# Patient Record
Sex: FEMALE | Race: OTHER | HISPANIC OR LATINO
[De-identification: names, ages, dates, MRNs, and addresses within clinical notes are randomized per-mention and may not be internally consistent; named-entity substitution may affect disease eponyms.]

---

## 2021-02-25 ENCOUNTER — APPOINTMENT (OUTPATIENT)
Dept: INTERNAL MEDICINE | Facility: CLINIC | Age: 71
End: 2021-02-25
Payer: MEDICARE

## 2021-02-25 VITALS
OXYGEN SATURATION: 96 % | RESPIRATION RATE: 14 BRPM | WEIGHT: 136 LBS | TEMPERATURE: 97.8 F | SYSTOLIC BLOOD PRESSURE: 122 MMHG | HEIGHT: 61 IN | DIASTOLIC BLOOD PRESSURE: 65 MMHG | BODY MASS INDEX: 25.68 KG/M2 | HEART RATE: 84 BPM

## 2021-02-25 DIAGNOSIS — Z82.49 FAMILY HISTORY OF ISCHEMIC HEART DISEASE AND OTHER DISEASES OF THE CIRCULATORY SYSTEM: ICD-10-CM

## 2021-02-25 DIAGNOSIS — Z00.00 ENCOUNTER FOR GENERAL ADULT MEDICAL EXAMINATION W/OUT ABNORMAL FINDINGS: ICD-10-CM

## 2021-02-25 DIAGNOSIS — Z87.42 PERSONAL HISTORY OF OTHER DISEASES OF THE FEMALE GENITAL TRACT: ICD-10-CM

## 2021-02-25 PROCEDURE — 99203 OFFICE O/P NEW LOW 30 MIN: CPT | Mod: 25

## 2021-02-25 PROCEDURE — G0439: CPT

## 2021-02-25 PROCEDURE — 99072 ADDL SUPL MATRL&STAF TM PHE: CPT

## 2021-02-25 PROCEDURE — G0444 DEPRESSION SCREEN ANNUAL: CPT | Mod: 59

## 2021-02-25 RX ORDER — ASPIRIN 81 MG
600-200 TABLET, DELAYED RELEASE (ENTERIC COATED) ORAL
Refills: 0 | Status: ACTIVE | COMMUNITY

## 2021-02-25 NOTE — PHYSICAL EXAM
[No Acute Distress] : no acute distress [Well Nourished] : well nourished [Well Developed] : well developed [Well-Appearing] : well-appearing [Normal Sclera/Conjunctiva] : normal sclera/conjunctiva [PERRL] : pupils equal round and reactive to light [EOMI] : extraocular movements intact [Normal Outer Ear/Nose] : the outer ears and nose were normal in appearance [Normal Oropharynx] : the oropharynx was normal [Normal TMs] : both tympanic membranes were normal [Normal Nasal Mucosa] : the nasal mucosa was normal [No JVD] : no jugular venous distention [No Lymphadenopathy] : no lymphadenopathy [Supple] : supple [Thyroid Normal, No Nodules] : the thyroid was normal and there were no nodules present [No Respiratory Distress] : no respiratory distress  [No Accessory Muscle Use] : no accessory muscle use [Clear to Auscultation] : lungs were clear to auscultation bilaterally [Normal Rate] : normal rate  [Regular Rhythm] : with a regular rhythm [Normal S1, S2] : normal S1 and S2 [No Murmur] : no murmur heard [No Carotid Bruits] : no carotid bruits [No Abdominal Bruit] : a ~M bruit was not heard ~T in the abdomen [No Varicosities] : no varicosities [Pedal Pulses Present] : the pedal pulses are present [No Edema] : there was no peripheral edema [No Palpable Aorta] : no palpable aorta [No Extremity Clubbing/Cyanosis] : no extremity clubbing/cyanosis [Soft] : abdomen soft [Non Tender] : non-tender [Non-distended] : non-distended [No Masses] : no abdominal mass palpated [No HSM] : no HSM [Normal Bowel Sounds] : normal bowel sounds [Normal Posterior Cervical Nodes] : no posterior cervical lymphadenopathy [Normal Anterior Cervical Nodes] : no anterior cervical lymphadenopathy [No CVA Tenderness] : no CVA  tenderness [No Spinal Tenderness] : no spinal tenderness [No Joint Swelling] : no joint swelling [No Rash] : no rash [Coordination Grossly Intact] : coordination grossly intact [No Focal Deficits] : no focal deficits [Normal Gait] : normal gait [Deep Tendon Reflexes (DTR)] : deep tendon reflexes were 2+ and symmetric [Speech Grossly Normal] : speech grossly normal [Memory Grossly Normal] : memory grossly normal [Normal Affect] : the affect was normal [Alert and Oriented x3] : oriented to person, place, and time [Normal Mood] : the mood was normal [Normal Insight/Judgement] : insight and judgment were intact [de-identified] : +decreased strength RUE, mild rigidity UE B/L

## 2021-02-25 NOTE — HISTORY OF PRESENT ILLNESS
[de-identified] : 71 y/o female patient is new to the office presents today with  for AWV/establish care \par - f/u Parkinson's disease  - patient currently following with neurology last appointment approx 2-3 months ago, states recently increased dosage of Cabidopa/levodopa, states feels helps control tremors better but if takes on empty stomach causes headaches/N/V

## 2021-02-25 NOTE — REVIEW OF SYSTEMS
[Fever] : no fever [Chills] : no chills [Fatigue] : fatigue [Hot Flashes] : no hot flashes [Night Sweats] : no night sweats [Recent Change In Weight] : ~T no recent weight change [Joint Pain] : no joint pain [Joint Stiffness] : joint stiffness [Joint Swelling] : no joint swelling [Muscle Weakness] : no muscle weakness [Muscle Pain] : no muscle pain [Back Pain] : no back pain [Headache] : no headache [Dizziness] : no dizziness [Fainting] : no fainting [Confusion] : no confusion [Memory Loss] : memory loss [Unsteady Walking] : no ataxia [Suicidal] : not suicidal [Insomnia] : no insomnia [Anxiety] : anxiety [Depression] : no depression [Negative] : Heme/Lymph

## 2021-02-25 NOTE — HEALTH RISK ASSESSMENT
[] : No [No] : In the past 12 months have you used drugs other than those required for medical reasons? No [No falls in past year] : Patient reported no falls in the past year [0] : 2) Feeling down, depressed, or hopeless: Not at all (0) [[id number]] : 0 [HIV test declined] : HIV test declined [Hepatitis C test declined] : Hepatitis C test declined [Change in mental status noted] : No change in mental status noted [None] : None [With Family] : lives with family [Retired] : retired [] :  [# Of Children ___] : has [unfilled] children [Sexually Active] : sexually active [High Risk Behavior] : no high risk behavior [Feels Safe at Home] : Feels safe at home [Fully functional (bathing, dressing, toileting, transferring, walking, feeding)] : Fully functional (bathing, dressing, toileting, transferring, walking, feeding) [Fully functional (using the telephone, shopping, preparing meals, housekeeping, doing laundry, using] : Fully functional and needs no help or supervision to perform IADLs (using the telephone, shopping, preparing meals, housekeeping, doing laundry, using transportation, managing medications and managing finances) [Reports changes in hearing] : Reports no changes in hearing [Reports changes in vision] : Reports no changes in vision [Reports changes in dental health] : Reports no changes in dental health

## 2021-03-03 LAB
25(OH)D3 SERPL-MCNC: 29 NG/ML
ALBUMIN SERPL ELPH-MCNC: 4.2 G/DL
ALP BLD-CCNC: 98 U/L
ALT SERPL-CCNC: 10 U/L
ANA PAT FLD IF-IMP: ABNORMAL
ANA SER IF-ACNC: ABNORMAL
ANION GAP SERPL CALC-SCNC: 14 MMOL/L
APPEARANCE: ABNORMAL
AST SERPL-CCNC: 18 U/L
BACTERIA: ABNORMAL
BASOPHILS # BLD AUTO: 0.11 K/UL
BASOPHILS NFR BLD AUTO: 1.2 %
BILIRUB SERPL-MCNC: 0.2 MG/DL
BILIRUBIN URINE: NEGATIVE
BLOOD URINE: ABNORMAL
BUN SERPL-MCNC: 19 MG/DL
CALCIUM SERPL-MCNC: 9.5 MG/DL
CHLORIDE SERPL-SCNC: 100 MMOL/L
CHOLEST SERPL-MCNC: 236 MG/DL
CO2 SERPL-SCNC: 25 MMOL/L
COLOR: YELLOW
CREAT SERPL-MCNC: 0.62 MG/DL
EOSINOPHIL # BLD AUTO: 0.4 K/UL
EOSINOPHIL NFR BLD AUTO: 4.5 %
ESTIMATED AVERAGE GLUCOSE: 131 MG/DL
FOLATE SERPL-MCNC: >20 NG/ML
GLUCOSE QUALITATIVE U: NEGATIVE
GLUCOSE SERPL-MCNC: 87 MG/DL
HBA1C MFR BLD HPLC: 6.2 %
HCT VFR BLD CALC: 38.7 %
HDLC SERPL-MCNC: 57 MG/DL
HGB BLD-MCNC: 11.9 G/DL
HYALINE CASTS: 0 /LPF
IMM GRANULOCYTES NFR BLD AUTO: 0.1 %
IRON SATN MFR SERPL: 21 %
IRON SERPL-MCNC: 75 UG/DL
KETONES URINE: NORMAL
LDLC SERPL CALC-MCNC: 156 MG/DL
LEUKOCYTE ESTERASE URINE: ABNORMAL
LYMPHOCYTES # BLD AUTO: 2.13 K/UL
LYMPHOCYTES NFR BLD AUTO: 23.9 %
MAGNESIUM SERPL-MCNC: 2.1 MG/DL
MAN DIFF?: NORMAL
MCHC RBC-ENTMCNC: 26.6 PG
MCHC RBC-ENTMCNC: 30.7 GM/DL
MCV RBC AUTO: 86.6 FL
MICROSCOPIC-UA: NORMAL
MONOCYTES # BLD AUTO: 0.67 K/UL
MONOCYTES NFR BLD AUTO: 7.5 %
NEUTROPHILS # BLD AUTO: 5.58 K/UL
NEUTROPHILS NFR BLD AUTO: 62.8 %
NITRITE URINE: NEGATIVE
NONHDLC SERPL-MCNC: 179 MG/DL
PH URINE: 5.5
PHOSPHATE SERPL-MCNC: 3.8 MG/DL
PLATELET # BLD AUTO: 363 K/UL
POTASSIUM SERPL-SCNC: 4.2 MMOL/L
PROT SERPL-MCNC: 7.4 G/DL
PROTEIN URINE: ABNORMAL
RBC # BLD: 4.47 M/UL
RBC # FLD: 14.1 %
RED BLOOD CELLS URINE: 12 /HPF
RHEUMATOID FACT SER QL: 10 IU/ML
SODIUM SERPL-SCNC: 139 MMOL/L
SPECIFIC GRAVITY URINE: 1.03
SQUAMOUS EPITHELIAL CELLS: >27 /HPF
T3FREE SERPL-MCNC: 3.23 PG/ML
T4 FREE SERPL-MCNC: 1.3 NG/DL
THYROGLOB AB SERPL-ACNC: <20 IU/ML
THYROPEROXIDASE AB SERPL IA-ACNC: 36.1 IU/ML
TIBC SERPL-MCNC: 354 UG/DL
TRIGL SERPL-MCNC: 114 MG/DL
TSH SERPL-ACNC: 0.45 UIU/ML
UIBC SERPL-MCNC: 280 UG/DL
UROBILINOGEN URINE: NORMAL
VIT B12 SERPL-MCNC: 566 PG/ML
WBC # FLD AUTO: 8.9 K/UL
WHITE BLOOD CELLS URINE: 134 /HPF

## 2021-06-02 ENCOUNTER — NON-APPOINTMENT (OUTPATIENT)
Age: 71
End: 2021-06-02

## 2021-06-02 ENCOUNTER — APPOINTMENT (OUTPATIENT)
Dept: INTERNAL MEDICINE | Facility: CLINIC | Age: 71
End: 2021-06-02
Payer: MEDICARE

## 2021-06-02 VITALS
BODY MASS INDEX: 19.26 KG/M2 | DIASTOLIC BLOOD PRESSURE: 76 MMHG | HEIGHT: 61 IN | WEIGHT: 102 LBS | OXYGEN SATURATION: 100 % | HEART RATE: 92 BPM | SYSTOLIC BLOOD PRESSURE: 142 MMHG | TEMPERATURE: 98 F

## 2021-06-02 DIAGNOSIS — R76.8 OTHER SPECIFIED ABNORMAL IMMUNOLOGICAL FINDINGS IN SERUM: ICD-10-CM

## 2021-06-02 PROCEDURE — 99214 OFFICE O/P EST MOD 30 MIN: CPT | Mod: 25

## 2021-06-02 PROCEDURE — 93000 ELECTROCARDIOGRAM COMPLETE: CPT

## 2021-06-03 ENCOUNTER — TRANSCRIPTION ENCOUNTER (OUTPATIENT)
Age: 71
End: 2021-06-03

## 2021-06-04 ENCOUNTER — NON-APPOINTMENT (OUTPATIENT)
Age: 71
End: 2021-06-04

## 2021-06-04 NOTE — PHYSICAL EXAM
[No Acute Distress] : no acute distress [Well Nourished] : well nourished [Well Developed] : well developed [Well-Appearing] : well-appearing [Normal Sclera/Conjunctiva] : normal sclera/conjunctiva [EOMI] : extraocular movements intact [PERRL] : pupils equal round and reactive to light [Normal Outer Ear/Nose] : the outer ears and nose were normal in appearance [Normal Oropharynx] : the oropharynx was normal [Normal TMs] : both tympanic membranes were normal [Normal Nasal Mucosa] : the nasal mucosa was normal [No JVD] : no jugular venous distention [No Lymphadenopathy] : no lymphadenopathy [Supple] : supple [Thyroid Normal, No Nodules] : the thyroid was normal and there were no nodules present [No Respiratory Distress] : no respiratory distress  [No Accessory Muscle Use] : no accessory muscle use [Clear to Auscultation] : lungs were clear to auscultation bilaterally [Normal Rate] : normal rate  [Regular Rhythm] : with a regular rhythm [Normal S1, S2] : normal S1 and S2 [No Murmur] : no murmur heard [No Carotid Bruits] : no carotid bruits [No Abdominal Bruit] : a ~M bruit was not heard ~T in the abdomen [No Varicosities] : no varicosities [Pedal Pulses Present] : the pedal pulses are present [No Edema] : there was no peripheral edema [No Palpable Aorta] : no palpable aorta [No Extremity Clubbing/Cyanosis] : no extremity clubbing/cyanosis [Soft] : abdomen soft [Non Tender] : non-tender [Non-distended] : non-distended [No Masses] : no abdominal mass palpated [No HSM] : no HSM [Normal Bowel Sounds] : normal bowel sounds [Normal Posterior Cervical Nodes] : no posterior cervical lymphadenopathy [Normal Anterior Cervical Nodes] : no anterior cervical lymphadenopathy [No CVA Tenderness] : no CVA  tenderness [No Spinal Tenderness] : no spinal tenderness [No Joint Swelling] : no joint swelling [No Rash] : no rash [No Focal Deficits] : no focal deficits [Coordination Grossly Intact] : coordination grossly intact [Normal Gait] : normal gait [Deep Tendon Reflexes (DTR)] : deep tendon reflexes were 2+ and symmetric [Speech Grossly Normal] : speech grossly normal [Memory Grossly Normal] : memory grossly normal [Normal Affect] : the affect was normal [Alert and Oriented x3] : oriented to person, place, and time [Normal Mood] : the mood was normal [Normal Insight/Judgement] : insight and judgment were intact [de-identified] : +decreased strength RUE, mild rigidity UE B/L

## 2021-06-04 NOTE — HISTORY OF PRESENT ILLNESS
[Spouse] : spouse [de-identified] : 71 y/o female patient presents today with , Chinese speaking only: \par - f/u Parkinson's disease - patient following with neurology, compliant with current medications, feels she is not well cared for wants referral for another neurologist, feels she doesn't understand diagnosis and treatment \par - f/u JACKY/depression/insomnia/fatigue - patient currently on Sertraline, not following with psychiatrist or therapist, crying in office, feels she does not understand why depression is worsening, no motivation to leave the house,  has tried to find psych but difficult to find someone who speaks Chinese \par - f/u intermittent palpitations/VELAZQUEZ/hyperlipidemia - worsened with anxiety/panic attacks, denies any CP/edema, has not been evaluated by cardiology in several years

## 2021-06-09 ENCOUNTER — TRANSCRIPTION ENCOUNTER (OUTPATIENT)
Age: 71
End: 2021-06-09

## 2021-06-09 LAB
25(OH)D3 SERPL-MCNC: 26 NG/ML
ALBUMIN SERPL ELPH-MCNC: 4.2 G/DL
ALP BLD-CCNC: 93 U/L
ALT SERPL-CCNC: <5 U/L
ANA SER IF-ACNC: NEGATIVE
ANION GAP SERPL CALC-SCNC: 13 MMOL/L
AST SERPL-CCNC: 13 U/L
BILIRUB SERPL-MCNC: 0.3 MG/DL
BUN SERPL-MCNC: 18 MG/DL
CALCIUM SERPL-MCNC: 9.2 MG/DL
CHLORIDE SERPL-SCNC: 102 MMOL/L
CHOLEST SERPL-MCNC: 243 MG/DL
CK SERPL-CCNC: 78 U/L
CO2 SERPL-SCNC: 26 MMOL/L
CREAT SERPL-MCNC: 0.54 MG/DL
CRP SERPL-MCNC: <3 MG/L
DSDNA AB SER-ACNC: <12 IU/ML
ERYTHROCYTE [SEDIMENTATION RATE] IN BLOOD BY WESTERGREN METHOD: 34 MM/HR
ESTIMATED AVERAGE GLUCOSE: 126 MG/DL
FOLATE SERPL-MCNC: 15.4 NG/ML
GLUCOSE SERPL-MCNC: 121 MG/DL
HBA1C MFR BLD HPLC: 6 %
HDLC SERPL-MCNC: 47 MG/DL
LDLC SERPL CALC-MCNC: 154 MG/DL
MAGNESIUM SERPL-MCNC: 2 MG/DL
NONHDLC SERPL-MCNC: 196 MG/DL
POTASSIUM SERPL-SCNC: 3.8 MMOL/L
PROT SERPL-MCNC: 7.3 G/DL
SODIUM SERPL-SCNC: 141 MMOL/L
TRIGL SERPL-MCNC: 209 MG/DL
VIT B12 SERPL-MCNC: 464 PG/ML

## 2021-06-28 ENCOUNTER — APPOINTMENT (OUTPATIENT)
Dept: NEUROLOGY | Facility: CLINIC | Age: 71
End: 2021-06-28
Payer: MEDICARE

## 2021-06-28 VITALS
HEIGHT: 61 IN | WEIGHT: 105 LBS | HEART RATE: 89 BPM | DIASTOLIC BLOOD PRESSURE: 78 MMHG | BODY MASS INDEX: 19.83 KG/M2 | SYSTOLIC BLOOD PRESSURE: 148 MMHG | TEMPERATURE: 97.2 F

## 2021-06-28 PROCEDURE — 99204 OFFICE O/P NEW MOD 45 MIN: CPT

## 2021-06-28 NOTE — HISTORY OF PRESENT ILLNESS
[FreeTextEntry1] : 70 year old woman company by , diagnosed with Parkinson disease,several years ago,presently treated with Sinemet 25/250, one tablet 3 times a day, well tolerated, occasionally nauseous.No daytime fatigue tiredness, muscle and a Paxil sleepiness, no mood changes.\par Violet in the past, by several neurologists, treated with Sinemet, initially 10/100, one tablet 2-4 times a day, a year ago increased to this amount.No history of difficulty swallowing or speaking, no recent falls or increased incidence of falling,describes her gait has slowed over time, does suffer from back pain, with reported tingling and numbness of the toes. No history of bowel bladder incontinence. No history of unilateral symptoms of weakness or numbness the face or extremities. no history of CVA, head injury.No family history.\par History of anxiety, on sertraline 100 mg p.o. q.d. overall symptoms are worse when she is anxious or stressed.

## 2021-06-28 NOTE — REVIEW OF SYSTEMS
[Confused or Disoriented] : no confusion [Difficulty with Language] : no ~M difficulty with language [Changed Thought Patterns] : no change in thought patterns [Facial Weakness] : no facial weakness [Arm Weakness] : no arm weakness [Hand Weakness] : no hand weakness [Leg Weakness] : no leg weakness [Difficulty Writing] : no difficulty writing [Difficulties in Speech] : no speech difficulties [Numbness] : numbness [Tingling] : tingling [Seizures] : no convulsions [Lightheadedness] : no lightheadedness [Migraine Headache] : migraine headaches [Difficulty Walking] : no difficulty walking [Ataxia] : no ataxia [As Noted in HPI] : as noted in HPI [Anxiety] : anxiety [Negative] : Heme/Lymph

## 2021-06-28 NOTE — PHYSICAL EXAM
[General Appearance - Alert] : alert [General Appearance - In No Acute Distress] : in no acute distress [Oriented To Time, Place, And Person] : oriented to person, place, and time [Impaired Insight] : insight and judgment were intact [Affect] : the affect was normal [Person] : oriented to person [Place] : oriented to place [Time] : oriented to time [Concentration Intact] : normal concentrating ability [Visual Intact] : visual attention was ~T not ~L decreased [Naming Objects] : no difficulty naming common objects [Repeating Phrases] : no difficulty repeating a phrase [Writing A Sentence] : no difficulty writing a sentence [Fluency] : fluency intact [Comprehension] : comprehension intact [Reading] : reading intact [Past History] : adequate knowledge of personal past history [Cranial Nerves Oculomotor (III)] : extraocular motion intact [Cranial Nerves Optic (II)] : visual acuity intact bilaterally,  visual fields full to confrontation, pupils equal round and reactive to light [Cranial Nerves Trigeminal (V)] : facial sensation intact symmetrically [Cranial Nerves Facial (VII)] : face symmetrical [Cranial Nerves Vestibulocochlear (VIII)] : hearing was intact bilaterally [Cranial Nerves Glossopharyngeal (IX)] : tongue and palate midline [Cranial Nerves Accessory (XI - Cranial And Spinal)] : head turning and shoulder shrug symmetric [Cranial Nerves Hypoglossal (XII)] : there was no tongue deviation with protrusion [Motor Tone] : muscle tone was normal in all four extremities [Motor Strength] : muscle strength was normal in all four extremities [No Muscle Atrophy] : normal bulk in all four extremities [Motor Handedness Right-Handed] : the patient is right hand dominant [Abnormal Walk] : normal gait [Sensation Tactile Decrease] : light touch was intact [Balance] : balance was intact [Tremor] : a tremor present [2+] : Ankle jerk left 2+ [Sclera] : the sclera and conjunctiva were normal [PERRL With Normal Accommodation] : pupils were equal in size, round, reactive to light, with normal accommodation [Outer Ear] : the ears and nose were normal in appearance [Hearing Threshold Finger Rub Not [place]] : hearing was normal [Paresis Pronator Drift Right-Sided] : no pronator drift on the right [Paresis Pronator Drift Left-Sided] : no pronator drift on the left [Past-pointing] : there was no past-pointing [Dysdiadochokinesia Bilaterally] : not present [Coordination - Dysmetria Impaired Finger-to-Nose Bilateral] : not present [Coordination - Dysmetria Impaired Heel-to-Shin Bilateral] : not present [Plantar Reflex Right Only] : normal on the right [Plantar Reflex Left Only] : normal on the left [Neck Appearance] : the appearance of the neck was normal [] : the neck was supple [Neck Cervical Mass (___cm)] : no neck mass was observed [Arterial Pulses Carotid] : carotid pulses were normal with no bruits

## 2021-06-28 NOTE — DISCUSSION/SUMMARY
[FreeTextEntry1] : 70-year-old right-handed woman accompanied by her , with a 5 year history of reported onset tremors more on the right and the left, reports evaluated in the past by neurologist, treated with Sinemet first and 10/100, 1 po TID, a year ago increased to 25/250 tid. Overall well-tolerated.\par Plan: We'll order PRADEEP scan to firm diagnosis.\par We'll continue with sertraline 100 mg p.o. q.d.return to office, one-month

## 2021-08-05 ENCOUNTER — OUTPATIENT (OUTPATIENT)
Dept: OUTPATIENT SERVICES | Facility: HOSPITAL | Age: 71
LOS: 1 days | End: 2021-08-05
Payer: COMMERCIAL

## 2021-08-05 ENCOUNTER — APPOINTMENT (OUTPATIENT)
Dept: NUCLEAR MEDICINE | Facility: IMAGING CENTER | Age: 71
End: 2021-08-05
Payer: MEDICARE

## 2021-08-05 ENCOUNTER — RESULT REVIEW (OUTPATIENT)
Age: 71
End: 2021-08-05

## 2021-08-05 DIAGNOSIS — G20 PARKINSON'S DISEASE: ICD-10-CM

## 2021-08-05 PROCEDURE — 78803 RP LOCLZJ TUM SPECT 1 AREA: CPT

## 2021-08-05 PROCEDURE — 78803 RP LOCLZJ TUM SPECT 1 AREA: CPT | Mod: 26

## 2021-08-05 PROCEDURE — A9584: CPT

## 2021-08-20 ENCOUNTER — APPOINTMENT (OUTPATIENT)
Dept: NEUROLOGY | Facility: CLINIC | Age: 71
End: 2021-08-20
Payer: MEDICARE

## 2021-08-20 VITALS
TEMPERATURE: 97.2 F | BODY MASS INDEX: 19.83 KG/M2 | DIASTOLIC BLOOD PRESSURE: 66 MMHG | SYSTOLIC BLOOD PRESSURE: 115 MMHG | WEIGHT: 105 LBS | HEART RATE: 86 BPM | HEIGHT: 61 IN

## 2021-08-20 PROCEDURE — 99213 OFFICE O/P EST LOW 20 MIN: CPT

## 2021-08-20 NOTE — DATA REVIEWED
[de-identified] :  EXAM:  NM BRAIN SPECT SA SD  \par \par \par PROCEDURE DATE:  08/05/2021  \par  \par \par \par INTERPRETATION:  RADIOPHARMACEUTICAL: 4.3 mCi I-123 Ioflupane, i.v.\par \par CLINICAL INFORMATION: 71-year-old female with movement disorder, referred to evaluate for essential tremor versus Parkinson's disease.  The patient is not taking any confounding medications at the time of the test.\par \par TECHNIQUE:  The patient was not pretreated with Lugol's solution.  SPECT imaging of the brain was performed approximately 3 hours after radiopharmaceutical injection.\par \par COMPARISON:  No prior DaTSCAN was available for comparison.\par \par FINDINGS: Moderately decreased radiopharmaceutical uptake in the right caudate nucleus.\par                     Moderately decreased radiopharmaceutical uptake in the left caudate nucleus.\par                     Markedly decreased radiopharmaceutical uptake in the right putamen.\par                     Markedly decreased radiopharmaceutical uptake in the left putamen.\par \par IMPRESSION:  Abnormal DaTScan. Findings support the diagnosis of Parkinson's disease or a Parkinsonian Syndrome.\par

## 2021-08-20 NOTE — REVIEW OF SYSTEMS
[As Noted in HPI] : as noted in HPI [Sleep Disturbances] : sleep disturbances [Anxiety] : anxiety [Negative] : Heme/Lymph

## 2021-08-20 NOTE — HISTORY OF PRESENT ILLNESS
[FreeTextEntry1] : 71 year old woman h xof marioosn accompanied by , doing about the same, tolerates sinemet 25/200 po TID, some nausea but tolerates it if taken with food. No daytime drowsiness, no compulsive thinking.\par Continues on Zoloft 100 mg po QD, anxious, always worrying, feeling better with the medicaton.\par

## 2021-08-20 NOTE — PHYSICAL EXAM
[General Appearance - Alert] : alert [General Appearance - In No Acute Distress] : in no acute distress [Oriented To Time, Place, And Person] : oriented to person, place, and time [Impaired Insight] : insight and judgment were intact [Affect] : the affect was normal [Person] : oriented to person [Place] : oriented to place [Time] : oriented to time [Concentration Intact] : normal concentrating ability [Fluency] : fluency intact [Cranial Nerves Optic (II)] : visual acuity intact bilaterally,  visual fields full to confrontation, pupils equal round and reactive to light [Cranial Nerves Oculomotor (III)] : extraocular motion intact [Cranial Nerves Trigeminal (V)] : facial sensation intact symmetrically [Cranial Nerves Vestibulocochlear (VIII)] : hearing was intact bilaterally [Cranial Nerves Facial (VII)] : face symmetrical [Cranial Nerves Glossopharyngeal (IX)] : tongue and palate midline [Cranial Nerves Hypoglossal (XII)] : there was no tongue deviation with protrusion [Cranial Nerves Accessory (XI - Cranial And Spinal)] : head turning and shoulder shrug symmetric [Motor Tone] : muscle tone was normal in all four extremities [Motor Strength] : muscle strength was normal in all four extremities [No Muscle Atrophy] : normal bulk in all four extremities [Motor Handedness Right-Handed] : the patient is right hand dominant [Paresis Pronator Drift Right-Sided] : no pronator drift on the right [Paresis Pronator Drift Left-Sided] : no pronator drift on the left [Sensation Tactile Decrease] : light touch was intact [Abnormal Walk] : normal gait [Balance] : balance was intact [Past-pointing] : there was no past-pointing [Tremor] : a tremor present [Dysdiadochokinesia Bilaterally] : not present

## 2021-08-20 NOTE — DISCUSSION/SUMMARY
[FreeTextEntry1] : 71-year-old woman history of Parkinson disease, continues on Sinemet 25/250, t.i.d., well tolerated.\par Recent PRADEEP scan c/w with the diagnosis of Parkinson disease.\par History of anxiety, improved on Zoloft 100 mg p.o. q.d. Well-tolerated.\par Will continue above medications, no changes.\par Return to office, 6-9 months

## 2021-09-03 ENCOUNTER — RX RENEWAL (OUTPATIENT)
Age: 71
End: 2021-09-03

## 2021-12-27 ENCOUNTER — RX RENEWAL (OUTPATIENT)
Age: 71
End: 2021-12-27

## 2022-01-10 ENCOUNTER — RX RENEWAL (OUTPATIENT)
Age: 72
End: 2022-01-10

## 2022-02-01 ENCOUNTER — RX RENEWAL (OUTPATIENT)
Age: 72
End: 2022-02-01

## 2022-02-18 ENCOUNTER — APPOINTMENT (OUTPATIENT)
Dept: NEUROLOGY | Facility: CLINIC | Age: 72
End: 2022-02-18
Payer: MEDICARE

## 2022-02-18 VITALS
BODY MASS INDEX: 18.88 KG/M2 | HEART RATE: 83 BPM | TEMPERATURE: 97.8 F | DIASTOLIC BLOOD PRESSURE: 78 MMHG | WEIGHT: 100 LBS | HEIGHT: 61 IN | SYSTOLIC BLOOD PRESSURE: 128 MMHG

## 2022-02-18 PROCEDURE — 99214 OFFICE O/P EST MOD 30 MIN: CPT

## 2022-02-18 RX ORDER — CARBIDOPA AND LEVODOPA 25; 100 MG/1; MG/1
25-100 TABLET ORAL 3 TIMES DAILY
Qty: 90 | Refills: 0 | Status: DISCONTINUED | COMMUNITY
Start: 2021-09-03 | End: 2022-02-18

## 2022-02-18 NOTE — REVIEW OF SYSTEMS
[Sleep Disturbances] : sleep disturbances [Anxiety] : anxiety [Emotional Problems] : emotional problems [Negative] : Heme/Lymph

## 2022-02-18 NOTE — HISTORY OF PRESENT ILLNESS
[FreeTextEntry1] : 71-year-old woman with history of Parkinson disease, here for followup, accompanied by her .Tolerating Sinemet 25/250, 3-4 times a day, with good results. Sometimes nausea, but otherwise well tolerated.\par Patient reports she is feeling very anxious, not eating very much, afraid of trouble swallowing,  reports she will stay up at night, worrying about death and illness. Takes sertraline 100 mg daily, prescribed by her primary care doctor.

## 2022-02-18 NOTE — PHYSICAL EXAM
[General Appearance - Alert] : alert [General Appearance - In No Acute Distress] : in no acute distress [FreeTextEntry1] : anxious [Person] : oriented to person [Place] : oriented to place [Time] : oriented to time [Concentration Intact] : normal concentrating ability [Fluency] : fluency intact [Cranial Nerves Optic (II)] : visual acuity intact bilaterally,  visual fields full to confrontation, pupils equal round and reactive to light [Cranial Nerves Oculomotor (III)] : extraocular motion intact [Cranial Nerves Trigeminal (V)] : facial sensation intact symmetrically [Cranial Nerves Facial (VII)] : face symmetrical [Cranial Nerves Vestibulocochlear (VIII)] : hearing was intact bilaterally [Cranial Nerves Glossopharyngeal (IX)] : tongue and palate midline [Cranial Nerves Accessory (XI - Cranial And Spinal)] : head turning and shoulder shrug symmetric [Cranial Nerves Hypoglossal (XII)] : there was no tongue deviation with protrusion [Motor Tone] : muscle tone was normal in all four extremities [Motor Strength] : muscle strength was normal in all four extremities [No Muscle Atrophy] : normal bulk in all four extremities [Motor Handedness Right-Handed] : the patient is right hand dominant [Paresis Pronator Drift Right-Sided] : no pronator drift on the right [Paresis Pronator Drift Left-Sided] : no pronator drift on the left [Sensation Tactile Decrease] : light touch was intact [Abnormal Walk] : normal gait [Balance] : balance was intact [Past-pointing] : there was no past-pointing [Tremor] : a tremor present [Dysdiadochokinesia Bilaterally] : not present [Coordination - Dysmetria Impaired Finger-to-Nose Bilateral] : not present

## 2022-02-18 NOTE — DISCUSSION/SUMMARY
[FreeTextEntry1] : 71-year-old woman, with Parkinson disease, mild. Review present treatment, no changes at this time.\par Continue Sinemet 25/250, 3-4 times a day.\par Anxiety disorder, poorly controlled. Will increase sertraline 150 mg p.o. q.d.\par advised to see a psychotherapist and or psychiatrist. Provided numbers for her to call.\par Return to office, 3-4 month

## 2022-03-25 RX ORDER — BUDESONIDE AND FORMOTEROL FUMARATE DIHYDRATE 160; 4.5 UG/1; UG/1
160-4.5 AEROSOL RESPIRATORY (INHALATION) TWICE DAILY
Qty: 1 | Refills: 1 | Status: ACTIVE | COMMUNITY
Start: 1900-01-01 | End: 1900-01-01

## 2022-04-06 ENCOUNTER — APPOINTMENT (OUTPATIENT)
Dept: INTERNAL MEDICINE | Facility: CLINIC | Age: 72
End: 2022-04-06
Payer: MEDICARE

## 2022-04-06 VITALS
HEIGHT: 61 IN | SYSTOLIC BLOOD PRESSURE: 161 MMHG | HEART RATE: 116 BPM | WEIGHT: 102 LBS | RESPIRATION RATE: 14 BRPM | DIASTOLIC BLOOD PRESSURE: 83 MMHG | TEMPERATURE: 98 F | BODY MASS INDEX: 19.26 KG/M2 | OXYGEN SATURATION: 99 %

## 2022-04-06 VITALS — DIASTOLIC BLOOD PRESSURE: 81 MMHG | SYSTOLIC BLOOD PRESSURE: 148 MMHG

## 2022-04-06 DIAGNOSIS — Z86.59 PERSONAL HISTORY OF OTHER MENTAL AND BEHAVIORAL DISORDERS: ICD-10-CM

## 2022-04-06 DIAGNOSIS — Z87.898 PERSONAL HISTORY OF OTHER SPECIFIED CONDITIONS: ICD-10-CM

## 2022-04-06 PROCEDURE — 99214 OFFICE O/P EST MOD 30 MIN: CPT | Mod: 25

## 2022-04-06 RX ORDER — SERTRALINE HYDROCHLORIDE 100 MG/1
100 TABLET, FILM COATED ORAL DAILY
Qty: 90 | Refills: 2 | Status: DISCONTINUED | COMMUNITY
End: 2022-04-06

## 2022-04-06 RX ORDER — CYPROHEPTADINE HYDROCHLORIDE 4 MG/1
4 TABLET ORAL
Qty: 60 | Refills: 1 | Status: DISCONTINUED | COMMUNITY
Start: 2021-06-09 | End: 2022-04-06

## 2022-04-06 RX ORDER — QUETIAPINE FUMARATE 50 MG/1
50 TABLET ORAL
Qty: 30 | Refills: 0 | Status: DISCONTINUED | COMMUNITY
Start: 2022-04-06 | End: 2022-04-06

## 2022-04-25 NOTE — PHYSICAL EXAM
[No Carotid Bruits] : no carotid bruits [No Abdominal Bruit] : a ~M bruit was not heard ~T in the abdomen [No Edema] : there was no peripheral edema [Normal] : no rash [Speech Grossly Normal] : speech grossly normal [Alert and Oriented x3] : oriented to person, place, and time [de-identified] : +very anxious in office, repetitive speech

## 2022-04-25 NOTE — HISTORY OF PRESENT ILLNESS
[de-identified] : 72 y/o female patient with PMH Parkinson's disease/hyperlipidemia/anxiety/asthma/Pre-DM presents today with  for follow up, check labs, denies any CP/edema\par

## 2022-04-25 NOTE — REVIEW OF SYSTEMS
[Fever] : no fever [Chills] : no chills [Fatigue] : fatigue [Hot Flashes] : no hot flashes [Night Sweats] : no night sweats [Recent Change In Weight] : ~T no recent weight change [Chest Pain] : no chest pain [Palpitations] : palpitations [Leg Claudication] : no leg claudication [Lower Ext Edema] : no lower extremity edema [Orthopnea] : no orthopnea [Paroxysmal Nocturnal Dyspnea] : no paroxysmal nocturnal dyspnea [Shortness Of Breath] : shortness of breath [Wheezing] : no wheezing [Cough] : no cough [Dyspnea on Exertion] : dyspnea on exertion [Headache] : no headache [Dizziness] : no dizziness [Fainting] : no fainting [Confusion] : no confusion [Memory Loss] : memory loss [Unsteady Walking] : no ataxia [Suicidal] : not suicidal [Insomnia] : insomnia [Anxiety] : anxiety [Depression] : depression [Negative] : Heme/Lymph [FreeTextEntry8] : decreased/poor appetite

## 2022-05-04 ENCOUNTER — APPOINTMENT (OUTPATIENT)
Dept: INTERNAL MEDICINE | Facility: CLINIC | Age: 72
End: 2022-05-04
Payer: MEDICARE

## 2022-05-04 VITALS
HEIGHT: 61 IN | BODY MASS INDEX: 20.01 KG/M2 | OXYGEN SATURATION: 96 % | TEMPERATURE: 98.1 F | WEIGHT: 106 LBS | SYSTOLIC BLOOD PRESSURE: 108 MMHG | RESPIRATION RATE: 14 BRPM | HEART RATE: 87 BPM | DIASTOLIC BLOOD PRESSURE: 65 MMHG

## 2022-05-04 DIAGNOSIS — R03.0 ELEVATED BLOOD-PRESSURE READING, W/OUT DIAGNOSIS OF HYPERTENSION: ICD-10-CM

## 2022-05-04 LAB
25(OH)D3 SERPL-MCNC: 62.4 NG/ML
ALBUMIN SERPL ELPH-MCNC: 4.3 G/DL
ALP BLD-CCNC: 104 U/L
ALT SERPL-CCNC: 6 U/L
ANION GAP SERPL CALC-SCNC: 14 MMOL/L
AST SERPL-CCNC: 19 U/L
BASOPHILS # BLD AUTO: 0.07 K/UL
BASOPHILS NFR BLD AUTO: 0.8 %
BILIRUB SERPL-MCNC: 0.3 MG/DL
BUN SERPL-MCNC: 18 MG/DL
CALCIUM SERPL-MCNC: 9.8 MG/DL
CHLORIDE SERPL-SCNC: 103 MMOL/L
CHOLEST SERPL-MCNC: 176 MG/DL
CO2 SERPL-SCNC: 26 MMOL/L
CREAT SERPL-MCNC: 0.6 MG/DL
EGFR: 95 ML/MIN/1.73M2
EOSINOPHIL # BLD AUTO: 0.22 K/UL
EOSINOPHIL NFR BLD AUTO: 2.6 %
ESTIMATED AVERAGE GLUCOSE: 131 MG/DL
GLUCOSE SERPL-MCNC: 105 MG/DL
HBA1C MFR BLD HPLC: 6.2 %
HCT VFR BLD CALC: 41.1 %
HDLC SERPL-MCNC: 63 MG/DL
HGB BLD-MCNC: 12.8 G/DL
IMM GRANULOCYTES NFR BLD AUTO: 0.3 %
LDLC SERPL CALC-MCNC: 87 MG/DL
LYMPHOCYTES # BLD AUTO: 1.7 K/UL
LYMPHOCYTES NFR BLD AUTO: 19.8 %
MAN DIFF?: NORMAL
MCHC RBC-ENTMCNC: 27.1 PG
MCHC RBC-ENTMCNC: 31.1 GM/DL
MCV RBC AUTO: 86.9 FL
MONOCYTES # BLD AUTO: 0.67 K/UL
MONOCYTES NFR BLD AUTO: 7.8 %
NEUTROPHILS # BLD AUTO: 5.9 K/UL
NEUTROPHILS NFR BLD AUTO: 68.7 %
NONHDLC SERPL-MCNC: 112 MG/DL
PLATELET # BLD AUTO: 321 K/UL
POTASSIUM SERPL-SCNC: 4.1 MMOL/L
PROT SERPL-MCNC: 7.5 G/DL
RBC # BLD: 4.73 M/UL
RBC # FLD: 14.1 %
SODIUM SERPL-SCNC: 143 MMOL/L
TRIGL SERPL-MCNC: 128 MG/DL
TSH SERPL-ACNC: 1.05 UIU/ML
WBC # FLD AUTO: 8.59 K/UL

## 2022-05-04 PROCEDURE — 99214 OFFICE O/P EST MOD 30 MIN: CPT

## 2022-05-04 RX ORDER — ERGOCALCIFEROL 1.25 MG/1
1.25 MG CAPSULE, LIQUID FILLED ORAL
Qty: 15 | Refills: 0 | Status: DISCONTINUED | COMMUNITY
Start: 2022-01-10 | End: 2022-05-04

## 2022-05-04 RX ORDER — SERTRALINE HYDROCHLORIDE 150 MG/1
150 CAPSULE ORAL
Qty: 30 | Refills: 5 | Status: DISCONTINUED | COMMUNITY
Start: 2022-02-18 | End: 2022-05-04

## 2022-05-24 NOTE — HISTORY OF PRESENT ILLNESS
[de-identified] : 70 y/o female patient with PMH Parkinson's disease presents today with :\par - review labs results \par - hyperlipidemia/Pre-DM - cont medications as prescribed, low carb diet, avoid processed, refined sugar, decrease in amount of sweets, increased physical activity\par - f/u anxiety/depression/insomnia - symptoms improved with Zoloft 150mg daily and sleep better with Mirtazapine 15mg QHS, denies any side effects \par

## 2022-05-24 NOTE — PHYSICAL EXAM
[No Carotid Bruits] : no carotid bruits [No Abdominal Bruit] : a ~M bruit was not heard ~T in the abdomen [No Edema] : there was no peripheral edema [Normal] : no rash [Speech Grossly Normal] : speech grossly normal [Alert and Oriented x3] : oriented to person, place, and time [de-identified] : +very anxious in office, repetitive speech

## 2022-05-24 NOTE — REVIEW OF SYSTEMS
[Fever] : no fever [Chills] : no chills [Fatigue] : fatigue [Hot Flashes] : no hot flashes [Night Sweats] : no night sweats [Recent Change In Weight] : ~T no recent weight change [Chest Pain] : no chest pain [Palpitations] : no palpitations [Leg Claudication] : no leg claudication [Lower Ext Edema] : no lower extremity edema [Orthopnea] : no orthopnea [Paroxysmal Nocturnal Dyspnea] : no paroxysmal nocturnal dyspnea [Shortness Of Breath] : no shortness of breath [Wheezing] : no wheezing [Cough] : no cough [Dyspnea on Exertion] : dyspnea on exertion [Headache] : no headache [Dizziness] : no dizziness [Fainting] : no fainting [Confusion] : no confusion [Memory Loss] : memory loss [Unsteady Walking] : no ataxia [Suicidal] : not suicidal [Insomnia] : no insomnia [Anxiety] : no anxiety [Depression] : no depression [Negative] : Heme/Lymph [FreeTextEntry8] : decreased/poor appetite

## 2022-09-13 ENCOUNTER — APPOINTMENT (OUTPATIENT)
Dept: INTERNAL MEDICINE | Facility: CLINIC | Age: 72
End: 2022-09-13

## 2022-09-13 VITALS
HEART RATE: 94 BPM | HEIGHT: 61 IN | BODY MASS INDEX: 19.45 KG/M2 | SYSTOLIC BLOOD PRESSURE: 145 MMHG | TEMPERATURE: 97.8 F | WEIGHT: 103 LBS | OXYGEN SATURATION: 94 % | DIASTOLIC BLOOD PRESSURE: 94 MMHG

## 2022-09-13 PROCEDURE — 99213 OFFICE O/P EST LOW 20 MIN: CPT

## 2022-10-03 ENCOUNTER — APPOINTMENT (OUTPATIENT)
Dept: INTERNAL MEDICINE | Facility: CLINIC | Age: 72
End: 2022-10-03

## 2022-10-03 VITALS
HEART RATE: 79 BPM | BODY MASS INDEX: 19.45 KG/M2 | OXYGEN SATURATION: 98 % | HEIGHT: 61 IN | SYSTOLIC BLOOD PRESSURE: 124 MMHG | DIASTOLIC BLOOD PRESSURE: 69 MMHG | WEIGHT: 103 LBS | TEMPERATURE: 97.9 F

## 2022-10-03 DIAGNOSIS — M81.0 AGE-RELATED OSTEOPOROSIS W/OUT CURRENT PATHOLOGICAL FRACTURE: ICD-10-CM

## 2022-10-03 DIAGNOSIS — R06.09 OTHER FORMS OF DYSPNEA: ICD-10-CM

## 2022-10-03 DIAGNOSIS — Z86.19 PERSONAL HISTORY OF OTHER INFECTIOUS AND PARASITIC DISEASES: ICD-10-CM

## 2022-10-03 PROCEDURE — 99214 OFFICE O/P EST MOD 30 MIN: CPT | Mod: 25

## 2022-10-03 PROCEDURE — 36415 COLL VENOUS BLD VENIPUNCTURE: CPT

## 2022-10-03 NOTE — HISTORY OF PRESENT ILLNESS
[de-identified] : 73 y/o female patient with PMH Parkinson's disease presents today with :\par - hyperlipidemia/Pre-DM - cont medications as prescribed, low carb diet, avoid processed, refined sugar, decrease in amount of sweets, increased physical activity\par - f/u anxiety/depression/insomnia - symptoms stable with Zoloft 150mg daily and sleep better with Mirtazapine 15mg QHS, denies any side effects \par

## 2022-10-03 NOTE — PHYSICAL EXAM
[Normal] : normal rate, regular rhythm, normal S1 and S2 and no murmur heard [de-identified] : +vesicular, erythematous rash along right arm extending to right thoracic region

## 2022-10-03 NOTE — PHYSICAL EXAM
[No Carotid Bruits] : no carotid bruits [No Abdominal Bruit] : a ~M bruit was not heard ~T in the abdomen [No Edema] : there was no peripheral edema [Normal] : no rash [Speech Grossly Normal] : speech grossly normal [Normal Affect] : the affect was normal [Alert and Oriented x3] : oriented to person, place, and time [Normal Insight/Judgement] : insight and judgment were intact [de-identified] : +repetitive speech, poor historian, flat affect

## 2022-10-03 NOTE — HISTORY OF PRESENT ILLNESS
[FreeTextEntry8] : 71 y/o female patient presents today with :\par - c/o vesicular, itching, burning, erythematous rash on right arm extending to right thoracic region x 3-4 days, no OTC meds tried

## 2022-10-03 NOTE — REVIEW OF SYSTEMS
[Insomnia] : insomnia [Anxiety] : anxiety [Depression] : depression [Negative] : Integumentary [Suicidal] : not suicidal

## 2022-10-24 ENCOUNTER — RX RENEWAL (OUTPATIENT)
Age: 72
End: 2022-10-24

## 2023-02-09 LAB
25(OH)D3 SERPL-MCNC: 59.3 NG/ML
ALBUMIN SERPL ELPH-MCNC: 4 G/DL
ALP BLD-CCNC: 107 U/L
ALT SERPL-CCNC: 10 U/L
ANION GAP SERPL CALC-SCNC: 16 MMOL/L
AST SERPL-CCNC: 21 U/L
BASOPHILS # BLD AUTO: 0.1 K/UL
BASOPHILS NFR BLD AUTO: 1.1 %
BILIRUB SERPL-MCNC: 0.3 MG/DL
BUN SERPL-MCNC: 18 MG/DL
CALCIUM SERPL-MCNC: 9.8 MG/DL
CHLORIDE SERPL-SCNC: 101 MMOL/L
CHOLEST SERPL-MCNC: 187 MG/DL
CO2 SERPL-SCNC: 23 MMOL/L
CREAT SERPL-MCNC: 0.61 MG/DL
EGFR: 94 ML/MIN/1.73M2
EOSINOPHIL # BLD AUTO: 0.26 K/UL
EOSINOPHIL NFR BLD AUTO: 2.9 %
ESTIMATED AVERAGE GLUCOSE: 128 MG/DL
GLUCOSE SERPL-MCNC: 111 MG/DL
HBA1C MFR BLD HPLC: 6.1 %
HCT VFR BLD CALC: 39.6 %
HDLC SERPL-MCNC: 60 MG/DL
HGB BLD-MCNC: 12.5 G/DL
IMM GRANULOCYTES NFR BLD AUTO: 0.2 %
IRON SATN MFR SERPL: 20 %
IRON SERPL-MCNC: 72 UG/DL
LDLC SERPL CALC-MCNC: 104 MG/DL
LYMPHOCYTES # BLD AUTO: 2.3 K/UL
LYMPHOCYTES NFR BLD AUTO: 25.3 %
MAGNESIUM SERPL-MCNC: 2 MG/DL
MAN DIFF?: NORMAL
MCHC RBC-ENTMCNC: 27.7 PG
MCHC RBC-ENTMCNC: 31.6 GM/DL
MCV RBC AUTO: 87.6 FL
MONOCYTES # BLD AUTO: 0.63 K/UL
MONOCYTES NFR BLD AUTO: 6.9 %
NEUTROPHILS # BLD AUTO: 5.78 K/UL
NEUTROPHILS NFR BLD AUTO: 63.6 %
NONHDLC SERPL-MCNC: 127 MG/DL
PLATELET # BLD AUTO: 308 K/UL
POTASSIUM SERPL-SCNC: 4.3 MMOL/L
PROT SERPL-MCNC: 7.2 G/DL
RBC # BLD: 4.52 M/UL
RBC # FLD: 15.9 %
SODIUM SERPL-SCNC: 140 MMOL/L
TIBC SERPL-MCNC: 356 UG/DL
TRIGL SERPL-MCNC: 117 MG/DL
TSH SERPL-ACNC: 0.6 UIU/ML
UIBC SERPL-MCNC: 284 UG/DL
VIT B12 SERPL-MCNC: 566 PG/ML
WBC # FLD AUTO: 9.09 K/UL

## 2023-03-21 ENCOUNTER — RX RENEWAL (OUTPATIENT)
Age: 73
End: 2023-03-21

## 2023-05-10 ENCOUNTER — APPOINTMENT (OUTPATIENT)
Dept: INTERNAL MEDICINE | Facility: CLINIC | Age: 73
End: 2023-05-10
Payer: MEDICARE

## 2023-05-10 VITALS
HEIGHT: 61 IN | BODY MASS INDEX: 17.75 KG/M2 | HEART RATE: 90 BPM | OXYGEN SATURATION: 93 % | WEIGHT: 94 LBS | SYSTOLIC BLOOD PRESSURE: 117 MMHG | TEMPERATURE: 98 F | DIASTOLIC BLOOD PRESSURE: 50 MMHG

## 2023-05-10 DIAGNOSIS — R63.4 ABNORMAL WEIGHT LOSS: ICD-10-CM

## 2023-05-10 DIAGNOSIS — M54.50 LOW BACK PAIN, UNSPECIFIED: ICD-10-CM

## 2023-05-10 DIAGNOSIS — R00.2 PALPITATIONS: ICD-10-CM

## 2023-05-10 DIAGNOSIS — F41.9 ANXIETY DISORDER, UNSPECIFIED: ICD-10-CM

## 2023-05-10 DIAGNOSIS — E55.9 VITAMIN D DEFICIENCY, UNSPECIFIED: ICD-10-CM

## 2023-05-10 DIAGNOSIS — G47.00 INSOMNIA, UNSPECIFIED: ICD-10-CM

## 2023-05-10 DIAGNOSIS — R63.0 ANOREXIA: ICD-10-CM

## 2023-05-10 DIAGNOSIS — R41.3 OTHER AMNESIA: ICD-10-CM

## 2023-05-10 DIAGNOSIS — J45.30 MILD PERSISTENT ASTHMA, UNCOMPLICATED: ICD-10-CM

## 2023-05-10 DIAGNOSIS — R73.03 PREDIABETES.: ICD-10-CM

## 2023-05-10 DIAGNOSIS — E78.2 MIXED HYPERLIPIDEMIA: ICD-10-CM

## 2023-05-10 DIAGNOSIS — F32.A ANXIETY DISORDER, UNSPECIFIED: ICD-10-CM

## 2023-05-10 DIAGNOSIS — G89.29 LOW BACK PAIN, UNSPECIFIED: ICD-10-CM

## 2023-05-10 PROCEDURE — 36415 COLL VENOUS BLD VENIPUNCTURE: CPT

## 2023-05-10 PROCEDURE — 99215 OFFICE O/P EST HI 40 MIN: CPT | Mod: 25

## 2023-05-12 PROBLEM — J45.30 MILD PERSISTENT ASTHMA WITHOUT COMPLICATION: Status: ACTIVE | Noted: 2021-06-02

## 2023-05-12 PROBLEM — M54.50 CHRONIC LUMBAR PAIN: Status: ACTIVE | Noted: 2023-05-10

## 2023-05-12 NOTE — REVIEW OF SYSTEMS
[Fever] : no fever [Chills] : no chills [Fatigue] : fatigue [Recent Change In Weight] : ~T recent weight change [Joint Pain] : joint pain [Joint Stiffness] : joint stiffness [Muscle Pain] : muscle pain [Suicidal] : not suicidal [Insomnia] : insomnia [Anxiety] : anxiety [Depression] : depression [Negative] : Integumentary

## 2023-05-12 NOTE — PHYSICAL EXAM
[No Carotid Bruits] : no carotid bruits [No Abdominal Bruit] : a ~M bruit was not heard ~T in the abdomen [No Edema] : there was no peripheral edema [No Joint Swelling] : no joint swelling [Normal] : no rash [Normal Affect] : the affect was normal [Alert and Oriented x3] : oriented to person, place, and time [Normal Insight/Judgement] : insight and judgment were intact [de-identified] : +stiffness of joints, +cervical/thoracic/lumber paraspinal tenderness  [de-identified] : +repetitive speech, poor historian, flat affect

## 2023-05-12 NOTE — HISTORY OF PRESENT ILLNESS
[de-identified] : 73 y/o female patient with PMH Parkinson's disease presents today with :\par - hyperlipidemia/Pre-DM - cont medications as prescribed, low carb diet, avoid processed, refined sugar, decrease in amount of sweets, increased physical activity\par - f/u anxiety/depression/insomnia - symptoms stable with Zoloft 150mg daily and sleep better with Mirtazapine 30mg QHS, denies any side effects \par - decreased appetite/weight loss - feels symptoms worsening, does not feel like eating,  has purchased Ensure and tries to make smoothies which at times she does not drink either \par - dementia - patient becoming very forgetful, becomes emotional very quickly, repeats herself multiple times during visits

## 2023-05-26 LAB
25(OH)D3 SERPL-MCNC: 84.1 NG/ML
ALBUMIN SERPL ELPH-MCNC: 4.4 G/DL
ALP BLD-CCNC: 126 U/L
ALT SERPL-CCNC: 6 U/L
ANION GAP SERPL CALC-SCNC: 13 MMOL/L
APPEARANCE: ABNORMAL
AST SERPL-CCNC: 18 U/L
BACTERIA UR CULT: NORMAL
BACTERIA: ABNORMAL /HPF
BASOPHILS # BLD AUTO: 0.08 K/UL
BASOPHILS NFR BLD AUTO: 0.9 %
BILIRUB SERPL-MCNC: 0.3 MG/DL
BILIRUBIN URINE: NEGATIVE
BLOOD URINE: NEGATIVE
BUN SERPL-MCNC: 21 MG/DL
CALCIUM SERPL-MCNC: 9.6 MG/DL
CAST: 0 /LPF
CHLORIDE SERPL-SCNC: 104 MMOL/L
CHOLEST SERPL-MCNC: 179 MG/DL
CO2 SERPL-SCNC: 26 MMOL/L
COLOR: YELLOW
CREAT SERPL-MCNC: 0.48 MG/DL
EGFR: 99 ML/MIN/1.73M2
EOSINOPHIL # BLD AUTO: 0.26 K/UL
EOSINOPHIL NFR BLD AUTO: 3 %
EPITHELIAL CELLS: 5 /HPF
ESTIMATED AVERAGE GLUCOSE: 126 MG/DL
GLUCOSE QUALITATIVE U: NEGATIVE MG/DL
GLUCOSE SERPL-MCNC: 108 MG/DL
HBA1C MFR BLD HPLC: 6 %
HCT VFR BLD CALC: 40.7 %
HDLC SERPL-MCNC: 60 MG/DL
HGB BLD-MCNC: 12.7 G/DL
IMM GRANULOCYTES NFR BLD AUTO: 0.2 %
IRON SATN MFR SERPL: 18 %
IRON SERPL-MCNC: 64 UG/DL
KETONES URINE: ABNORMAL MG/DL
LDLC SERPL CALC-MCNC: 93 MG/DL
LEUCINE CRYSTALS: PRESENT
LEUKOCYTE ESTERASE URINE: ABNORMAL
LYMPHOCYTES # BLD AUTO: 1.85 K/UL
LYMPHOCYTES NFR BLD AUTO: 21.6 %
MAN DIFF?: NORMAL
MCHC RBC-ENTMCNC: 27.2 PG
MCHC RBC-ENTMCNC: 31.2 GM/DL
MCV RBC AUTO: 87.2 FL
MICROSCOPIC-UA: NORMAL
MONOCYTES # BLD AUTO: 0.68 K/UL
MONOCYTES NFR BLD AUTO: 7.9 %
NEUTROPHILS # BLD AUTO: 5.67 K/UL
NEUTROPHILS NFR BLD AUTO: 66.4 %
NITRITE URINE: POSITIVE
NONHDLC SERPL-MCNC: 119 MG/DL
PH URINE: 6.5
PLATELET # BLD AUTO: 334 K/UL
POTASSIUM SERPL-SCNC: 4.3 MMOL/L
PROT SERPL-MCNC: 7.3 G/DL
PROTEIN URINE: NEGATIVE MG/DL
RBC # BLD: 4.67 M/UL
RBC # FLD: 14.5 %
RED BLOOD CELLS URINE: 7 /HPF
REVIEW: NORMAL
SODIUM SERPL-SCNC: 143 MMOL/L
SPECIFIC GRAVITY URINE: 1.02
TIBC SERPL-MCNC: 355 UG/DL
TRIGL SERPL-MCNC: 131 MG/DL
UIBC SERPL-MCNC: 292 UG/DL
UROBILINOGEN URINE: 0.2 MG/DL
VIT B12 SERPL-MCNC: 462 PG/ML
WBC # FLD AUTO: 8.56 K/UL
WHITE BLOOD CELLS URINE: 6 /HPF

## 2023-05-26 RX ORDER — ERGOCALCIFEROL 1.25 MG/1
1.25 MG CAPSULE ORAL
Qty: 15 | Refills: 1 | Status: DISCONTINUED | COMMUNITY
Start: 2021-03-10 | End: 2023-05-26

## 2023-06-09 ENCOUNTER — APPOINTMENT (OUTPATIENT)
Dept: NEUROLOGY | Facility: CLINIC | Age: 73
End: 2023-06-09

## 2023-08-19 ENCOUNTER — TRANSCRIPTION ENCOUNTER (OUTPATIENT)
Age: 73
End: 2023-08-19

## 2023-08-19 ENCOUNTER — INPATIENT (INPATIENT)
Facility: HOSPITAL | Age: 73
LOS: 2 days | Discharge: HOME | DRG: 871 | End: 2023-08-22
Attending: STUDENT IN AN ORGANIZED HEALTH CARE EDUCATION/TRAINING PROGRAM | Admitting: STUDENT IN AN ORGANIZED HEALTH CARE EDUCATION/TRAINING PROGRAM
Payer: COMMERCIAL

## 2023-08-19 VITALS
WEIGHT: 104.94 LBS | TEMPERATURE: 99 F | HEART RATE: 111 BPM | DIASTOLIC BLOOD PRESSURE: 44 MMHG | SYSTOLIC BLOOD PRESSURE: 130 MMHG | RESPIRATION RATE: 18 BRPM | OXYGEN SATURATION: 96 %

## 2023-08-19 LAB
ALBUMIN SERPL ELPH-MCNC: 3.4 G/DL — SIGNIFICANT CHANGE UP (ref 3.3–5)
ALP SERPL-CCNC: 89 U/L — SIGNIFICANT CHANGE UP (ref 40–120)
ALT FLD-CCNC: 17 U/L — SIGNIFICANT CHANGE UP (ref 12–78)
ANION GAP SERPL CALC-SCNC: 8 MMOL/L — SIGNIFICANT CHANGE UP (ref 5–17)
APTT BLD: 29.1 SEC — SIGNIFICANT CHANGE UP (ref 24.5–35.6)
AST SERPL-CCNC: 75 U/L — HIGH (ref 15–37)
BASOPHILS # BLD AUTO: 0.05 K/UL — SIGNIFICANT CHANGE UP (ref 0–0.2)
BASOPHILS NFR BLD AUTO: 0.4 % — SIGNIFICANT CHANGE UP (ref 0–2)
BILIRUB SERPL-MCNC: 0.6 MG/DL — SIGNIFICANT CHANGE UP (ref 0.2–1.2)
BUN SERPL-MCNC: 31 MG/DL — HIGH (ref 7–23)
CALCIUM SERPL-MCNC: 8.8 MG/DL — SIGNIFICANT CHANGE UP (ref 8.5–10.1)
CHLORIDE SERPL-SCNC: 114 MMOL/L — HIGH (ref 96–108)
CO2 SERPL-SCNC: 21 MMOL/L — LOW (ref 22–31)
CREAT SERPL-MCNC: 1 MG/DL — SIGNIFICANT CHANGE UP (ref 0.5–1.3)
EGFR: 59 ML/MIN/1.73M2 — LOW
EOSINOPHIL # BLD AUTO: 0.05 K/UL — SIGNIFICANT CHANGE UP (ref 0–0.5)
EOSINOPHIL NFR BLD AUTO: 0.4 % — SIGNIFICANT CHANGE UP (ref 0–6)
GLUCOSE SERPL-MCNC: 112 MG/DL — HIGH (ref 70–99)
HCT VFR BLD CALC: 37.6 % — SIGNIFICANT CHANGE UP (ref 34.5–45)
HGB BLD-MCNC: 12.3 G/DL — SIGNIFICANT CHANGE UP (ref 11.5–15.5)
IMM GRANULOCYTES NFR BLD AUTO: 0.8 % — SIGNIFICANT CHANGE UP (ref 0–0.9)
INR BLD: 1.24 RATIO — HIGH (ref 0.85–1.18)
LACTATE SERPL-SCNC: 2.3 MMOL/L — HIGH (ref 0.7–2)
LYMPHOCYTES # BLD AUTO: 1.2 K/UL — SIGNIFICANT CHANGE UP (ref 1–3.3)
LYMPHOCYTES # BLD AUTO: 9.5 % — LOW (ref 13–44)
MCHC RBC-ENTMCNC: 28.3 PG — SIGNIFICANT CHANGE UP (ref 27–34)
MCHC RBC-ENTMCNC: 32.7 GM/DL — SIGNIFICANT CHANGE UP (ref 32–36)
MCV RBC AUTO: 86.4 FL — SIGNIFICANT CHANGE UP (ref 80–100)
MONOCYTES # BLD AUTO: 1.14 K/UL — HIGH (ref 0–0.9)
MONOCYTES NFR BLD AUTO: 9 % — SIGNIFICANT CHANGE UP (ref 2–14)
NEUTROPHILS # BLD AUTO: 10.15 K/UL — HIGH (ref 1.8–7.4)
NEUTROPHILS NFR BLD AUTO: 79.9 % — HIGH (ref 43–77)
NRBC # BLD: 0 /100 WBCS — SIGNIFICANT CHANGE UP (ref 0–0)
PLATELET # BLD AUTO: 321 K/UL — SIGNIFICANT CHANGE UP (ref 150–400)
POTASSIUM SERPL-MCNC: 4 MMOL/L — SIGNIFICANT CHANGE UP (ref 3.5–5.3)
POTASSIUM SERPL-SCNC: 4 MMOL/L — SIGNIFICANT CHANGE UP (ref 3.5–5.3)
PROT SERPL-MCNC: 7.9 G/DL — SIGNIFICANT CHANGE UP (ref 6–8.3)
PROTHROM AB SERPL-ACNC: 14.4 SEC — HIGH (ref 9.5–13)
RBC # BLD: 4.35 M/UL — SIGNIFICANT CHANGE UP (ref 3.8–5.2)
RBC # FLD: 14.6 % — HIGH (ref 10.3–14.5)
SARS-COV-2 RNA SPEC QL NAA+PROBE: SIGNIFICANT CHANGE UP
SODIUM SERPL-SCNC: 143 MMOL/L — SIGNIFICANT CHANGE UP (ref 135–145)
WBC # BLD: 12.69 K/UL — HIGH (ref 3.8–10.5)
WBC # FLD AUTO: 12.69 K/UL — HIGH (ref 3.8–10.5)

## 2023-08-19 PROCEDURE — 74176 CT ABD & PELVIS W/O CONTRAST: CPT | Mod: 26,MA

## 2023-08-19 PROCEDURE — 99285 EMERGENCY DEPT VISIT HI MDM: CPT

## 2023-08-19 RX ORDER — SODIUM CHLORIDE 9 MG/ML
1000 INJECTION INTRAMUSCULAR; INTRAVENOUS; SUBCUTANEOUS ONCE
Refills: 0 | Status: COMPLETED | OUTPATIENT
Start: 2023-08-19 | End: 2023-08-19

## 2023-08-19 RX ORDER — CEFTRIAXONE 500 MG/1
1000 INJECTION, POWDER, FOR SOLUTION INTRAMUSCULAR; INTRAVENOUS ONCE
Refills: 0 | Status: COMPLETED | OUTPATIENT
Start: 2023-08-19 | End: 2023-08-19

## 2023-08-19 RX ADMIN — SODIUM CHLORIDE 1000 MILLILITER(S): 9 INJECTION INTRAMUSCULAR; INTRAVENOUS; SUBCUTANEOUS at 22:15

## 2023-08-19 NOTE — ED PROVIDER NOTE - PHYSICAL EXAMINATION
Gen: AAOx2 (does not know the year)  Head/eyes: NC/AT, PERRL  ENT: airway patent  Neck: supple  Pulm/lung: Bilateral clear BS  CV/heart: RRR  GI/Abd: soft, NT/ND, +BS, no guarding/rebound tenderness  Musculoskeletal: no edema/erythema/cyanosis  Skin: no rash  Neuro: AAOx2, grossly intact

## 2023-08-19 NOTE — ED PROVIDER NOTE - URETHRAL CATHETER POA INSERTION DATE, QM
I have reviewed discharge instructions with the patient and parent. The parent verbalized understanding. 8/20/2023

## 2023-08-19 NOTE — ED PROVIDER NOTE - OBJECTIVE STATEMENT
73-year-old female history of Parkinson's recently discharged from [facility] for urinary retention infection started on Bactrim just finished her last dose today.  Found to have a low-grade fever 99.3 here in the ER.  Son at bedside states that patient has been more confused today.  AAO x2 currently.  As per son patient states she thought she was going to the mall.  Patient currently has a Mallory catheter in place noted by the son to be more dark with blood in the catheter.  As per son patient tugged on the catheter and might of possibly displaced it. 73-year-old female history of Parkinson's recently discharged from [facility] for urinary retention infection started on Bactrim just finished her last dose today.  Found to have a low-grade fever 99.3 here in the ER.  Son at bedside states that patient has been more confused today.  AAO x2 currently.  As per son patient states she thought she was going to the mall.  Patient currently has a Mallory catheter in place noted by the son to be more dark with blood in the catheter.  As per son patient tugged on the catheter and might of possibly displaced it. 73-year-old female history of Parkinson's recently discharged from [facility] for urinary retention infection started on Bactrim just finished her last dose today.  Found to have a low-grade fever 99.3 here in the ER.  Son at bedside states that patient has been more confused today.  AAO x2 currently.  As per son patient states she thought she was going to the mall.  Patient currently has a Mallory catheter in place noted by the son to be more dark with blood in the catheter.  As per son patient tugged on the catheter and might of possibly displaced it.

## 2023-08-19 NOTE — ED PROVIDER NOTE - CLINICAL SUMMARY MEDICAL DECISION MAKING FREE TEXT BOX
73-year-old female history of Parkinson's recently discharged from [facility] for urinary retention infection started on Bactrim just finished her last dose today.  Found to have a low-grade fever 99.3 here in the ER.  Son at bedside states that patient has been more confused today.  AAO x2 currently.  As per son patient states she thought she was going to the mall.  Patient currently has a Rod catheter in place noted by the son to be more dark with blood in the catheter.  As per son patient tugged on the catheter and might of possibly displaced it.    r/o stone vs abdominal infection, viral etiology, labs, CT, IV fluids, rod replacement 73-year-old female history of Parkinson's recently discharged from [facility] for urinary retention infection started on Bactrim just finished her last dose today.  Found to have a low-grade fever 99.3 here in the ER.  Son at bedside states that patient has been more confused today.  AAO x2 currently.  As per son patient states she thought she was going to the mall.  Patient currently has a Rod catheter in place noted by the son to be more dark with blood in the catheter.  As per son patient tugged on the catheter and might of possibly displaced it.    r/o stone vs abdominal infection, viral etiology, labs, CT, IV fluids, rod replacement 73-year-old female history of Parkinson's recently discharged from [facility] for urinary retention infection started on Bactrim just finished her last dose today.  Found to have a low-grade fever 99.3 here in the ER.  Son at bedside states that patient has been more confused today.  AAO x2 currently.  As per son patient states she thought she was going to the mall.  Patient currently has a Rod catheter in place noted by the son to be more dark with blood in the catheter.  As per son patient tugged on the catheter and might of possibly displaced it.    r/o stone vs abdominal infection, viral etiology, labs, CT, IV fluids, rod replacement

## 2023-08-19 NOTE — ED PROVIDER NOTE - PROGRESS NOTE DETAILS
As per nurse at bedside patient has urine all over the stretcher catheter likely dislodged will replace with another Mallory catheter. Discussed case with Dr. Paez urology recommend manually reducing bladder prolapse and at the same time inserting Mallory catheter.  Was successful in reducing bladder prolapse with an Mallory insertion UA sent Dr. Simpson aware will admit.

## 2023-08-20 DIAGNOSIS — E78.5 HYPERLIPIDEMIA, UNSPECIFIED: ICD-10-CM

## 2023-08-20 DIAGNOSIS — A41.9 SEPSIS, UNSPECIFIED ORGANISM: ICD-10-CM

## 2023-08-20 DIAGNOSIS — N81.10 CYSTOCELE, UNSPECIFIED: ICD-10-CM

## 2023-08-20 DIAGNOSIS — Z29.9 ENCOUNTER FOR PROPHYLACTIC MEASURES, UNSPECIFIED: ICD-10-CM

## 2023-08-20 DIAGNOSIS — N39.0 URINARY TRACT INFECTION, SITE NOT SPECIFIED: ICD-10-CM

## 2023-08-20 DIAGNOSIS — G20 PARKINSON'S DISEASE: ICD-10-CM

## 2023-08-20 DIAGNOSIS — F41.9 ANXIETY DISORDER, UNSPECIFIED: ICD-10-CM

## 2023-08-20 LAB
ALBUMIN SERPL ELPH-MCNC: 2.7 G/DL — LOW (ref 3.3–5)
ALP SERPL-CCNC: 74 U/L — SIGNIFICANT CHANGE UP (ref 40–120)
ALT FLD-CCNC: 8 U/L — LOW (ref 12–78)
ANION GAP SERPL CALC-SCNC: 8 MMOL/L — SIGNIFICANT CHANGE UP (ref 5–17)
APPEARANCE UR: ABNORMAL
AST SERPL-CCNC: 75 U/L — HIGH (ref 15–37)
BASOPHILS # BLD AUTO: 0.04 K/UL — SIGNIFICANT CHANGE UP (ref 0–0.2)
BASOPHILS NFR BLD AUTO: 0.4 % — SIGNIFICANT CHANGE UP (ref 0–2)
BILIRUB SERPL-MCNC: 0.6 MG/DL — SIGNIFICANT CHANGE UP (ref 0.2–1.2)
BILIRUB UR-MCNC: NEGATIVE — SIGNIFICANT CHANGE UP
BUN SERPL-MCNC: 18 MG/DL — SIGNIFICANT CHANGE UP (ref 7–23)
CALCIUM SERPL-MCNC: 7.7 MG/DL — LOW (ref 8.5–10.1)
CHLORIDE SERPL-SCNC: 115 MMOL/L — HIGH (ref 96–108)
CHOLEST SERPL-MCNC: 139 MG/DL — SIGNIFICANT CHANGE UP
CO2 SERPL-SCNC: 20 MMOL/L — LOW (ref 22–31)
COLOR SPEC: ABNORMAL
CREAT SERPL-MCNC: 0.58 MG/DL — SIGNIFICANT CHANGE UP (ref 0.5–1.3)
DIFF PNL FLD: ABNORMAL
EGFR: 94 ML/MIN/1.73M2 — SIGNIFICANT CHANGE UP
EOSINOPHIL # BLD AUTO: 0.19 K/UL — SIGNIFICANT CHANGE UP (ref 0–0.5)
EOSINOPHIL NFR BLD AUTO: 2 % — SIGNIFICANT CHANGE UP (ref 0–6)
GLUCOSE SERPL-MCNC: 87 MG/DL — SIGNIFICANT CHANGE UP (ref 70–99)
GLUCOSE UR QL: NEGATIVE MG/DL — SIGNIFICANT CHANGE UP
HCT VFR BLD CALC: 32.4 % — LOW (ref 34.5–45)
HDLC SERPL-MCNC: 37 MG/DL — LOW
HGB BLD-MCNC: 10.3 G/DL — LOW (ref 11.5–15.5)
IMM GRANULOCYTES NFR BLD AUTO: 0.3 % — SIGNIFICANT CHANGE UP (ref 0–0.9)
KETONES UR-MCNC: NEGATIVE MG/DL — SIGNIFICANT CHANGE UP
LACTATE SERPL-SCNC: 0.5 MMOL/L — LOW (ref 0.7–2)
LEUKOCYTE ESTERASE UR-ACNC: ABNORMAL
LIPID PNL WITH DIRECT LDL SERPL: 91 MG/DL — SIGNIFICANT CHANGE UP
LYMPHOCYTES # BLD AUTO: 1.64 K/UL — SIGNIFICANT CHANGE UP (ref 1–3.3)
LYMPHOCYTES # BLD AUTO: 17.1 % — SIGNIFICANT CHANGE UP (ref 13–44)
MCHC RBC-ENTMCNC: 28.1 PG — SIGNIFICANT CHANGE UP (ref 27–34)
MCHC RBC-ENTMCNC: 31.8 GM/DL — LOW (ref 32–36)
MCV RBC AUTO: 88.5 FL — SIGNIFICANT CHANGE UP (ref 80–100)
MONOCYTES # BLD AUTO: 0.77 K/UL — SIGNIFICANT CHANGE UP (ref 0–0.9)
MONOCYTES NFR BLD AUTO: 8 % — SIGNIFICANT CHANGE UP (ref 2–14)
NEUTROPHILS # BLD AUTO: 6.91 K/UL — SIGNIFICANT CHANGE UP (ref 1.8–7.4)
NEUTROPHILS NFR BLD AUTO: 72.2 % — SIGNIFICANT CHANGE UP (ref 43–77)
NITRITE UR-MCNC: NEGATIVE — SIGNIFICANT CHANGE UP
NON HDL CHOLESTEROL: 102 MG/DL — SIGNIFICANT CHANGE UP
NRBC # BLD: 0 /100 WBCS — SIGNIFICANT CHANGE UP (ref 0–0)
PH UR: 5.5 — SIGNIFICANT CHANGE UP (ref 5–8)
PLATELET # BLD AUTO: 269 K/UL — SIGNIFICANT CHANGE UP (ref 150–400)
POTASSIUM SERPL-MCNC: 3.8 MMOL/L — SIGNIFICANT CHANGE UP (ref 3.5–5.3)
POTASSIUM SERPL-SCNC: 3.8 MMOL/L — SIGNIFICANT CHANGE UP (ref 3.5–5.3)
PROT SERPL-MCNC: 6.4 G/DL — SIGNIFICANT CHANGE UP (ref 6–8.3)
PROT UR-MCNC: 100 MG/DL
RBC # BLD: 3.66 M/UL — LOW (ref 3.8–5.2)
RBC # FLD: 14.8 % — HIGH (ref 10.3–14.5)
SODIUM SERPL-SCNC: 143 MMOL/L — SIGNIFICANT CHANGE UP (ref 135–145)
SP GR SPEC: 1.02 — SIGNIFICANT CHANGE UP (ref 1–1.03)
TRIGL SERPL-MCNC: 50 MG/DL — SIGNIFICANT CHANGE UP
UROBILINOGEN FLD QL: 1 MG/DL — SIGNIFICANT CHANGE UP (ref 0.2–1)
WBC # BLD: 9.58 K/UL — SIGNIFICANT CHANGE UP (ref 3.8–10.5)
WBC # FLD AUTO: 9.58 K/UL — SIGNIFICANT CHANGE UP (ref 3.8–10.5)

## 2023-08-20 PROCEDURE — 99232 SBSQ HOSP IP/OBS MODERATE 35: CPT

## 2023-08-20 PROCEDURE — 99223 1ST HOSP IP/OBS HIGH 75: CPT

## 2023-08-20 PROCEDURE — 93010 ELECTROCARDIOGRAM REPORT: CPT

## 2023-08-20 PROCEDURE — 99223 1ST HOSP IP/OBS HIGH 75: CPT | Mod: GC

## 2023-08-20 PROCEDURE — 70450 CT HEAD/BRAIN W/O DYE: CPT | Mod: 26

## 2023-08-20 RX ORDER — BACLOFEN 100 %
10 POWDER (GRAM) MISCELLANEOUS EVERY 8 HOURS
Refills: 0 | Status: DISCONTINUED | OUTPATIENT
Start: 2023-08-20 | End: 2023-08-22

## 2023-08-20 RX ORDER — CARBIDOPA AND LEVODOPA 25; 100 MG/1; MG/1
1 TABLET ORAL
Refills: 0 | Status: DISCONTINUED | OUTPATIENT
Start: 2023-08-20 | End: 2023-08-22

## 2023-08-20 RX ORDER — ERTAPENEM SODIUM 1 G/1
INJECTION, POWDER, LYOPHILIZED, FOR SOLUTION INTRAMUSCULAR; INTRAVENOUS
Refills: 0 | Status: DISCONTINUED | OUTPATIENT
Start: 2023-08-20 | End: 2023-08-22

## 2023-08-20 RX ORDER — ERGOCALCIFEROL 1.25 MG/1
50000 CAPSULE ORAL
Refills: 0 | Status: DISCONTINUED | OUTPATIENT
Start: 2023-08-20 | End: 2023-08-22

## 2023-08-20 RX ORDER — MIRTAZAPINE 45 MG/1
30 TABLET, ORALLY DISINTEGRATING ORAL AT BEDTIME
Refills: 0 | Status: DISCONTINUED | OUTPATIENT
Start: 2023-08-20 | End: 2023-08-22

## 2023-08-20 RX ORDER — MEGESTROL ACETATE 40 MG/ML
400 SUSPENSION ORAL DAILY
Refills: 0 | Status: DISCONTINUED | OUTPATIENT
Start: 2023-08-20 | End: 2023-08-22

## 2023-08-20 RX ORDER — ENOXAPARIN SODIUM 100 MG/ML
40 INJECTION SUBCUTANEOUS EVERY 24 HOURS
Refills: 0 | Status: DISCONTINUED | OUTPATIENT
Start: 2023-08-20 | End: 2023-08-22

## 2023-08-20 RX ORDER — ATORVASTATIN CALCIUM 80 MG/1
10 TABLET, FILM COATED ORAL AT BEDTIME
Refills: 0 | Status: DISCONTINUED | OUTPATIENT
Start: 2023-08-20 | End: 2023-08-22

## 2023-08-20 RX ORDER — ERTAPENEM SODIUM 1 G/1
1000 INJECTION, POWDER, LYOPHILIZED, FOR SOLUTION INTRAMUSCULAR; INTRAVENOUS EVERY 24 HOURS
Refills: 0 | Status: DISCONTINUED | OUTPATIENT
Start: 2023-08-21 | End: 2023-08-22

## 2023-08-20 RX ORDER — ERTAPENEM SODIUM 1 G/1
1000 INJECTION, POWDER, LYOPHILIZED, FOR SOLUTION INTRAMUSCULAR; INTRAVENOUS ONCE
Refills: 0 | Status: COMPLETED | OUTPATIENT
Start: 2023-08-20 | End: 2023-08-20

## 2023-08-20 RX ORDER — SERTRALINE 25 MG/1
150 TABLET, FILM COATED ORAL DAILY
Refills: 0 | Status: DISCONTINUED | OUTPATIENT
Start: 2023-08-20 | End: 2023-08-22

## 2023-08-20 RX ADMIN — CEFTRIAXONE 100 MILLIGRAM(S): 500 INJECTION, POWDER, FOR SOLUTION INTRAMUSCULAR; INTRAVENOUS at 00:25

## 2023-08-20 RX ADMIN — SODIUM CHLORIDE 1000 MILLILITER(S): 9 INJECTION INTRAMUSCULAR; INTRAVENOUS; SUBCUTANEOUS at 00:25

## 2023-08-20 RX ADMIN — ERTAPENEM SODIUM 120 MILLIGRAM(S): 1 INJECTION, POWDER, LYOPHILIZED, FOR SOLUTION INTRAMUSCULAR; INTRAVENOUS at 06:44

## 2023-08-20 RX ADMIN — MEGESTROL ACETATE 400 MILLIGRAM(S): 40 SUSPENSION ORAL at 11:51

## 2023-08-20 RX ADMIN — CARBIDOPA AND LEVODOPA 1 TABLET(S): 25; 100 TABLET ORAL at 06:44

## 2023-08-20 RX ADMIN — SERTRALINE 150 MILLIGRAM(S): 25 TABLET, FILM COATED ORAL at 11:51

## 2023-08-20 RX ADMIN — MIRTAZAPINE 30 MILLIGRAM(S): 45 TABLET, ORALLY DISINTEGRATING ORAL at 21:37

## 2023-08-20 RX ADMIN — SODIUM CHLORIDE 1000 MILLILITER(S): 9 INJECTION INTRAMUSCULAR; INTRAVENOUS; SUBCUTANEOUS at 02:07

## 2023-08-20 RX ADMIN — Medication 10 MILLIGRAM(S): at 14:17

## 2023-08-20 RX ADMIN — CARBIDOPA AND LEVODOPA 1 TABLET(S): 25; 100 TABLET ORAL at 17:30

## 2023-08-20 RX ADMIN — Medication 10 MILLIGRAM(S): at 06:44

## 2023-08-20 RX ADMIN — ENOXAPARIN SODIUM 40 MILLIGRAM(S): 100 INJECTION SUBCUTANEOUS at 06:43

## 2023-08-20 RX ADMIN — Medication 10 MILLIGRAM(S): at 21:37

## 2023-08-20 RX ADMIN — CARBIDOPA AND LEVODOPA 1 TABLET(S): 25; 100 TABLET ORAL at 11:51

## 2023-08-20 RX ADMIN — ATORVASTATIN CALCIUM 10 MILLIGRAM(S): 80 TABLET, FILM COATED ORAL at 21:37

## 2023-08-20 NOTE — ED ADULT NURSE NOTE - OBJECTIVE STATEMENT
Pt to ED with c/c possible dislodged rod catheter which was placed 5 days ago. Pt is noted with prolapse to bladder.

## 2023-08-20 NOTE — H&P ADULT - PROBLEM SELECTOR PLAN 2
- CT AP: No hydronephrosis or obstructing stone. Bladder prolapse with wall thickening suggesting cystitis. Correlate with urinalysis.  - S/P manual bladder prolapse reduction in ED  - consulted urology (Dr Paez), following - CT AP: No hydronephrosis or obstructing stone. Bladder prolapse with wall thickening suggesting cystitis. Correlate with urinalysis.  - S/P manual bladder prolapse reduction in ED  - Urology (Dr Paez), following

## 2023-08-20 NOTE — H&P ADULT - NSICDXFAMILYHX_GEN_ALL_CORE_FT
FAMILY HISTORY:  No pertinent family history in first degree relatives FAMILY HISTORY:  No pertinent family history in first degree relatives     FAMILY HISTORY:  Father  Still living? Unknown  FH: CAD (coronary artery disease), Age at diagnosis: Age Unknown  FH: HTN (hypertension), Age at diagnosis: Age Unknown

## 2023-08-20 NOTE — PATIENT PROFILE ADULT - FUNCTIONAL ASSESSMENT - BASIC MOBILITY 6.
3-calculated by average/Not able to assess (calculate score using [facility] averaging method)  3-calculated by average/Not able to assess (calculate score using [facility] averaging method)  3-calculated by average/Not able to assess (calculate score using [facility] averaging method)

## 2023-08-20 NOTE — PROGRESS NOTE ADULT - SUBJECTIVE AND OBJECTIVE BOX
Patient is a 73y old  Female who presents with a chief complaint of UTI, sepsis (20 Aug 2023 10:13)      INTERVAL HPI/OVERNIGHT EVENTS: Patient seen and examined at bedside. No overnight events. Tolerating diet. Denies fever, chills, chest pain, shortness of breath, abdominal pain, nausea/vomiting, headache.    MEDICATIONS  (STANDING):  atorvastatin 10 milliGRAM(s) Oral at bedtime  baclofen 10 milliGRAM(s) Oral every 8 hours  carbidopa/levodopa  25/250 1 Tablet(s) Oral four times a day  enoxaparin Injectable 40 milliGRAM(s) SubCutaneous every 24 hours  ergocalciferol 54026 Unit(s) Oral every week  ertapenem  IVPB      megestrol Suspension 400 milliGRAM(s) Oral daily  mirtazapine 30 milliGRAM(s) Oral at bedtime  sertraline 150 milliGRAM(s) Oral daily    MEDICATIONS  (PRN):      Allergies    penicillin (Unknown)    Intolerances        REVIEW OF SYSTEMS:  CONSTITUTIONAL: No fever or chills  HEENT:  No headache, no sore throat  RESPIRATORY: No cough, wheezing, or shortness of breath  CARDIOVASCULAR: No chest pain, palpitations  GASTROINTESTINAL: No abd pain, nausea, vomiting, or diarrhea  GENITOURINARY: No dysuria, frequency, or hematuria  NEUROLOGICAL: no focal weakness or dizziness  MUSCULOSKELETAL: no myalgias     Vital Signs Last 24 Hrs  T(C): 37.4 (20 Aug 2023 05:30), Max: 37.4 (19 Aug 2023 21:10)  T(F): 99.3 (20 Aug 2023 05:30), Max: 99.3 (19 Aug 2023 21:10)  HR: 89 (20 Aug 2023 05:30) (87 - 111)  BP: 125/61 (20 Aug 2023 05:30) (113/64 - 131/81)  BP(mean): --  RR: 17 (20 Aug 2023 05:30) (15 - 18)  SpO2: 96% (20 Aug 2023 05:30) (93% - 98%)    Parameters below as of 20 Aug 2023 05:30  Patient On (Oxygen Delivery Method): room air        PHYSICAL EXAM:  GENERAL: NAD  HEENT:  anicteric, moist mucous membranes  CHEST/LUNG:  CTA b/l, no rales, wheezes, or rhonchi  HEART:  RRR, S1, S2  ABDOMEN:  BS+, soft, nontender, nondistended  EXTREMITIES: no edema, cyanosis, or calf tenderness  NERVOUS SYSTEM: answers questions and follows commands appropriately    LABS:                        10.3   9.58  )-----------( 269      ( 20 Aug 2023 08:00 )             32.4     CBC Full  -  ( 20 Aug 2023 08:00 )  WBC Count : 9.58 K/uL  Hemoglobin : 10.3 g/dL  Hematocrit : 32.4 %  Platelet Count - Automated : 269 K/uL  Mean Cell Volume : 88.5 fl  Mean Cell Hemoglobin : 28.1 pg  Mean Cell Hemoglobin Concentration : 31.8 gm/dL  Auto Neutrophil # : 6.91 K/uL  Auto Lymphocyte # : 1.64 K/uL  Auto Monocyte # : 0.77 K/uL  Auto Eosinophil # : 0.19 K/uL  Auto Basophil # : 0.04 K/uL  Auto Neutrophil % : 72.2 %  Auto Lymphocyte % : 17.1 %  Auto Monocyte % : 8.0 %  Auto Eosinophil % : 2.0 %  Auto Basophil % : 0.4 %    20 Aug 2023 08:00    143    |  115    |  18     ----------------------------<  87     3.8     |  20     |  0.58     Ca    7.7        20 Aug 2023 08:00    TPro  6.4    /  Alb  2.7    /  TBili  0.6    /  DBili  x      /  AST  75     /  ALT  8      /  AlkPhos  74     20 Aug 2023 08:00    PT/INR - ( 19 Aug 2023 22:05 )   PT: 14.4 sec;   INR: 1.24 ratio         PTT - ( 19 Aug 2023 22:05 )  PTT:29.1 sec  Urinalysis Basic - ( 20 Aug 2023 08:00 )    Color: x / Appearance: x / SG: x / pH: x  Gluc: 87 mg/dL / Ketone: x  / Bili: x / Urobili: x   Blood: x / Protein: x / Nitrite: x   Leuk Esterase: x / RBC: x / WBC x   Sq Epi: x / Non Sq Epi: x / Bacteria: x      CAPILLARY BLOOD GLUCOSE              RADIOLOGY & ADDITIONAL TESTS:    Personally reviewed.     Consultant(s) Notes Reviewed:  [x] YES  [ ] NO     Patient is a 73y old  Female who presents with a chief complaint of UTI, sepsis (20 Aug 2023 10:13)      INTERVAL HPI/OVERNIGHT EVENTS: Patient seen and examined at bedside. No overnight events. Tolerating diet. Denies fever, chills, chest pain, shortness of breath, abdominal pain, nausea/vomiting, headache.    MEDICATIONS  (STANDING):  atorvastatin 10 milliGRAM(s) Oral at bedtime  baclofen 10 milliGRAM(s) Oral every 8 hours  carbidopa/levodopa  25/250 1 Tablet(s) Oral four times a day  enoxaparin Injectable 40 milliGRAM(s) SubCutaneous every 24 hours  ergocalciferol 58323 Unit(s) Oral every week  ertapenem  IVPB      megestrol Suspension 400 milliGRAM(s) Oral daily  mirtazapine 30 milliGRAM(s) Oral at bedtime  sertraline 150 milliGRAM(s) Oral daily    MEDICATIONS  (PRN):      Allergies    penicillin (Unknown)    Intolerances        REVIEW OF SYSTEMS:  CONSTITUTIONAL: No fever or chills  HEENT:  No headache, no sore throat  RESPIRATORY: No cough, wheezing, or shortness of breath  CARDIOVASCULAR: No chest pain, palpitations  GASTROINTESTINAL: No abd pain, nausea, vomiting, or diarrhea  GENITOURINARY: No dysuria, frequency, or hematuria  NEUROLOGICAL: no focal weakness or dizziness  MUSCULOSKELETAL: no myalgias     Vital Signs Last 24 Hrs  T(C): 37.4 (20 Aug 2023 05:30), Max: 37.4 (19 Aug 2023 21:10)  T(F): 99.3 (20 Aug 2023 05:30), Max: 99.3 (19 Aug 2023 21:10)  HR: 89 (20 Aug 2023 05:30) (87 - 111)  BP: 125/61 (20 Aug 2023 05:30) (113/64 - 131/81)  BP(mean): --  RR: 17 (20 Aug 2023 05:30) (15 - 18)  SpO2: 96% (20 Aug 2023 05:30) (93% - 98%)    Parameters below as of 20 Aug 2023 05:30  Patient On (Oxygen Delivery Method): room air        PHYSICAL EXAM:  GENERAL: NAD  HEENT:  anicteric, moist mucous membranes  CHEST/LUNG:  CTA b/l, no rales, wheezes, or rhonchi  HEART:  RRR, S1, S2  ABDOMEN:  BS+, soft, nontender, nondistended  EXTREMITIES: no edema, cyanosis, or calf tenderness  NERVOUS SYSTEM: answers questions and follows commands appropriately    LABS:                        10.3   9.58  )-----------( 269      ( 20 Aug 2023 08:00 )             32.4     CBC Full  -  ( 20 Aug 2023 08:00 )  WBC Count : 9.58 K/uL  Hemoglobin : 10.3 g/dL  Hematocrit : 32.4 %  Platelet Count - Automated : 269 K/uL  Mean Cell Volume : 88.5 fl  Mean Cell Hemoglobin : 28.1 pg  Mean Cell Hemoglobin Concentration : 31.8 gm/dL  Auto Neutrophil # : 6.91 K/uL  Auto Lymphocyte # : 1.64 K/uL  Auto Monocyte # : 0.77 K/uL  Auto Eosinophil # : 0.19 K/uL  Auto Basophil # : 0.04 K/uL  Auto Neutrophil % : 72.2 %  Auto Lymphocyte % : 17.1 %  Auto Monocyte % : 8.0 %  Auto Eosinophil % : 2.0 %  Auto Basophil % : 0.4 %    20 Aug 2023 08:00    143    |  115    |  18     ----------------------------<  87     3.8     |  20     |  0.58     Ca    7.7        20 Aug 2023 08:00    TPro  6.4    /  Alb  2.7    /  TBili  0.6    /  DBili  x      /  AST  75     /  ALT  8      /  AlkPhos  74     20 Aug 2023 08:00    PT/INR - ( 19 Aug 2023 22:05 )   PT: 14.4 sec;   INR: 1.24 ratio         PTT - ( 19 Aug 2023 22:05 )  PTT:29.1 sec  Urinalysis Basic - ( 20 Aug 2023 08:00 )    Color: x / Appearance: x / SG: x / pH: x  Gluc: 87 mg/dL / Ketone: x  / Bili: x / Urobili: x   Blood: x / Protein: x / Nitrite: x   Leuk Esterase: x / RBC: x / WBC x   Sq Epi: x / Non Sq Epi: x / Bacteria: x      CAPILLARY BLOOD GLUCOSE              RADIOLOGY & ADDITIONAL TESTS:    Personally reviewed.     Consultant(s) Notes Reviewed:  [x] YES  [ ] NO     Patient is a 73y old  Female who presents with a chief complaint of UTI, sepsis (20 Aug 2023 10:13)      INTERVAL HPI/OVERNIGHT EVENTS: Patient seen and examined at bedside. No overnight events. Tolerating diet. Denies fever, chills, chest pain, shortness of breath, abdominal pain, nausea/vomiting, headache.    MEDICATIONS  (STANDING):  atorvastatin 10 milliGRAM(s) Oral at bedtime  baclofen 10 milliGRAM(s) Oral every 8 hours  carbidopa/levodopa  25/250 1 Tablet(s) Oral four times a day  enoxaparin Injectable 40 milliGRAM(s) SubCutaneous every 24 hours  ergocalciferol 71209 Unit(s) Oral every week  ertapenem  IVPB      megestrol Suspension 400 milliGRAM(s) Oral daily  mirtazapine 30 milliGRAM(s) Oral at bedtime  sertraline 150 milliGRAM(s) Oral daily    MEDICATIONS  (PRN):      Allergies    penicillin (Unknown)    Intolerances        REVIEW OF SYSTEMS:  CONSTITUTIONAL: No fever or chills  HEENT:  No headache, no sore throat  RESPIRATORY: No cough, wheezing, or shortness of breath  CARDIOVASCULAR: No chest pain, palpitations  GASTROINTESTINAL: No abd pain, nausea, vomiting, or diarrhea  GENITOURINARY: No dysuria, frequency, or hematuria  NEUROLOGICAL: no focal weakness or dizziness  MUSCULOSKELETAL: no myalgias     Vital Signs Last 24 Hrs  T(C): 37.4 (20 Aug 2023 05:30), Max: 37.4 (19 Aug 2023 21:10)  T(F): 99.3 (20 Aug 2023 05:30), Max: 99.3 (19 Aug 2023 21:10)  HR: 89 (20 Aug 2023 05:30) (87 - 111)  BP: 125/61 (20 Aug 2023 05:30) (113/64 - 131/81)  BP(mean): --  RR: 17 (20 Aug 2023 05:30) (15 - 18)  SpO2: 96% (20 Aug 2023 05:30) (93% - 98%)    Parameters below as of 20 Aug 2023 05:30  Patient On (Oxygen Delivery Method): room air        PHYSICAL EXAM:  GENERAL: NAD  HEENT:  anicteric, moist mucous membranes  CHEST/LUNG:  CTA b/l, no rales, wheezes, or rhonchi  HEART:  RRR, S1, S2  ABDOMEN:  BS+, soft, nontender, nondistended  EXTREMITIES: no edema, cyanosis, or calf tenderness  NERVOUS SYSTEM: answers questions and follows commands appropriately    LABS:                        10.3   9.58  )-----------( 269      ( 20 Aug 2023 08:00 )             32.4     CBC Full  -  ( 20 Aug 2023 08:00 )  WBC Count : 9.58 K/uL  Hemoglobin : 10.3 g/dL  Hematocrit : 32.4 %  Platelet Count - Automated : 269 K/uL  Mean Cell Volume : 88.5 fl  Mean Cell Hemoglobin : 28.1 pg  Mean Cell Hemoglobin Concentration : 31.8 gm/dL  Auto Neutrophil # : 6.91 K/uL  Auto Lymphocyte # : 1.64 K/uL  Auto Monocyte # : 0.77 K/uL  Auto Eosinophil # : 0.19 K/uL  Auto Basophil # : 0.04 K/uL  Auto Neutrophil % : 72.2 %  Auto Lymphocyte % : 17.1 %  Auto Monocyte % : 8.0 %  Auto Eosinophil % : 2.0 %  Auto Basophil % : 0.4 %    20 Aug 2023 08:00    143    |  115    |  18     ----------------------------<  87     3.8     |  20     |  0.58     Ca    7.7        20 Aug 2023 08:00    TPro  6.4    /  Alb  2.7    /  TBili  0.6    /  DBili  x      /  AST  75     /  ALT  8      /  AlkPhos  74     20 Aug 2023 08:00    PT/INR - ( 19 Aug 2023 22:05 )   PT: 14.4 sec;   INR: 1.24 ratio         PTT - ( 19 Aug 2023 22:05 )  PTT:29.1 sec  Urinalysis Basic - ( 20 Aug 2023 08:00 )    Color: x / Appearance: x / SG: x / pH: x  Gluc: 87 mg/dL / Ketone: x  / Bili: x / Urobili: x   Blood: x / Protein: x / Nitrite: x   Leuk Esterase: x / RBC: x / WBC x   Sq Epi: x / Non Sq Epi: x / Bacteria: x      CAPILLARY BLOOD GLUCOSE              RADIOLOGY & ADDITIONAL TESTS:    Personally reviewed.     Consultant(s) Notes Reviewed:  [x] YES  [ ] NO

## 2023-08-20 NOTE — ED PROCEDURE NOTE - CPROC ED TIME OUT STATEMENT1
“Patient's name, , procedure and correct site were confirmed during the [place] Timeout.” “Patient's name, , procedure and correct site were confirmed during the [place] Timeout.” “Patient's name, , procedure and correct site were confirmed during the [place] Timeout.”

## 2023-08-20 NOTE — H&P ADULT - ATTENDING COMMENTS
74yo F w/ PMHx Parkinson's disease, anxiety/depression, HLD presents to ED c/o Rod catheter w/ bloody urine. Admitted for sepsis and metabolic encephalopathy likely 2/2 acute cystitis with hematuria refractory to outpatient antibiotics. Admit to medicine. Possible resistant UTI. UA not impressive but did have course of antibiotics (in hospital and outpatient with bactrim). Prior cultures on HIE reviewed with >3 organisms. Given recent treatment without improvement and rod catheter, will broaden coverage with IV invanz. CT reviewed with bladder prolapse and signs of cystitis. Bladder prolapse reduced by ER physician who consulted urology. Infectious disease consult. Follow up blood/urine cultures.     Agree with H&P as outlined above, edited where appropriate. 72yo F w/ PMHx Parkinson's disease, anxiety/depression, HLD presents to ED c/o Rod catheter w/ bloody urine. Admitted for sepsis and metabolic encephalopathy likely 2/2 acute cystitis with hematuria refractory to outpatient antibiotics. Admit to medicine. Possible resistant UTI. UA not impressive but did have course of antibiotics (in hospital and outpatient with bactrim). Prior cultures on HIE reviewed with >3 organisms. Given recent treatment without improvement and rod catheter, will broaden coverage with IV invanz. CT reviewed with bladder prolapse and signs of cystitis. Bladder prolapse reduced by ER physician who consulted urology. Infectious disease consult. Follow up blood/urine cultures.     Agree with H&P as outlined above, edited where appropriate.

## 2023-08-20 NOTE — H&P ADULT - NSHPPHYSICALEXAM_GEN_ALL_CORE
CONSTITUTIONAL: Frail, no acute distress  EYES: No conjunctival or scleral injection, non-icteric  ENMT: Oral mucosa with moist membranes.   NECK: Supple, symmetric and without tracheal deviation   RESP: No respiratory distress, no use of accessory muscles; CTA b/l, no WRR  CV: RRR, +S1S2, no peripheral edema  GI: Soft, NT, ND  LYMPH: No cervical LAD or tenderness  MSK: Normal ROM without pain, no joint pain   SKIN: No rashes or ulcers noted  NEURO: Sensation intact in upper and lower extremities b/l to light touch   PSYCH: Appropriate insight/judgment; A+O x 3, mood and affect appropriate T(C): 37.4 (20 Aug 2023 05:30), Max: 37.4 (19 Aug 2023 21:10)  T(F): 99.3 (20 Aug 2023 05:30), Max: 99.3 (19 Aug 2023 21:10)  HR: 89 (20 Aug 2023 05:30) (87 - 111)  BP: 125/61 (20 Aug 2023 05:30) (113/64 - 131/81)  RR: 17 (20 Aug 2023 05:30) (15 - 18)  SpO2: 96% (20 Aug 2023 05:30) (93% - 98%)    CONSTITUTIONAL: Frail, no acute distress  EYES: No conjunctival or scleral injection, non-icteric  ENMT: Oral mucosa with moist membranes.   NECK: Supple, symmetric and without tracheal deviation   RESP: No respiratory distress, no use of accessory muscles; CTA b/l, no WRR  CV: RRR, +S1S2, no peripheral edema  GI: Soft, NT, ND  LYMPH: No cervical LAD or tenderness  MSK: Normal ROM without pain, no joint pain   SKIN: No rashes or ulcers noted  NEURO: Sensation intact in upper and lower extremities b/l to light touch   PSYCH: Appropriate insight/judgment; A+O x 3, mood and affect appropriate

## 2023-08-20 NOTE — CONSULT NOTE ADULT - TIME BILLING
This includes chart review, patient assessment. Antibiotic dosing and management with clinical pharmacy.

## 2023-08-20 NOTE — H&P ADULT - NSHPREVIEWOFSYSTEMS_GEN_ALL_CORE
REVIEW OF SYSTEMS:  CONSTITUTIONAL: + weakness, fatigue. No fever/chills or appetite changes.  HENMT: No HA, lightheadedness/dizziness  RESPIRATORY: No cough, wheezing, hemoptysis; No shortness of breath.  CARDIOVASCULAR: No chest pain, palpitations.  GASTROINTESTINAL: No abdominal or epigastric pain. No nausea or vomiting; No diarrhea or constipation.  GENITOURINARY: No dysuria, changes in frequency, hematuria, or incontinence  NEUROLOGICAL: Baseline strength. Sensation intact bilaterally.  SKIN: No itching, rashes  MUSCULOSKELETAL: No joint pain or swelling; No muscle, back, or extremity pain REVIEW OF SYSTEMS:  CONSTITUTIONAL: + weakness, fatigue. No fever/chills or appetite changes.  HENMT: No HA, lightheadedness/dizziness  RESPIRATORY: No cough, wheezing, hemoptysis; No shortness of breath.  CARDIOVASCULAR: No chest pain, palpitations.  GASTROINTESTINAL: No abdominal or epigastric pain. No nausea or vomiting; No diarrhea or constipation.  GENITOURINARY: +hematuria  NEUROLOGICAL: Baseline strength. Sensation intact bilaterally.  SKIN: No itching, rashes  MUSCULOSKELETAL: No joint pain or swelling; No muscle, back, or extremity pain

## 2023-08-20 NOTE — H&P ADULT - HISTORY OF PRESENT ILLNESS
72yo F w/ PMHx Parkinson's disease, anxiety/depression, HLD, bladder presents to ED c/o Mallory catheter w/ bloody urine. Per discussion w/ son at bedside, pt recently admitted at [facility] from 8/14-8/17 for urinary retention. She was discharged w/ Mallory and Bactrim. Pt's son reports that pt accidentally tugged on her Mallory and displaced it on the evening of 8/18. Subsequently, pt's son reports that pt was producing blood-tinged urine. On 8/19 AM, pt's son reports that pt seemed more confused, unable to accurately recall where she was, thus prompting him to bring pt to hospital. Pt denies CP, SOB, N/V/D/C, abdominal pain, HA, dizziness, cough.     IN THE ED:  Temp   99.3F ,  , BP  130/44,RR  18, SpO2 96  S/P IV sodium chloride 0.9% 1000mL bolus x1, IV ceftriaxone 1000mg x1  S/P manual bladder prolapse reduction  Labs significant for WBC 12.69, lactate 2.3  UA: orange, cloudy, small leukocyte esterase, large blood  Imaging:  CT AP: No hydronephrosis or obstructing stone. Bladder prolapse with wall thickening suggesting cystitis. Correlate with urinalysis. 74yo F w/ PMHx Parkinson's disease, anxiety/depression, HLD, bladder presents to ED c/o Mallory catheter w/ bloody urine. Per discussion w/ son at bedside, pt recently admitted at [facility] from 8/14-8/17 for urinary retention. She was discharged w/ Mallory and Bactrim. Pt's son reports that pt accidentally tugged on her Mallory and displaced it on the evening of 8/18. Subsequently, pt's son reports that pt was producing blood-tinged urine. On 8/19 AM, pt's son reports that pt seemed more confused, unable to accurately recall where she was, thus prompting him to bring pt to hospital. Pt denies CP, SOB, N/V/D/C, abdominal pain, HA, dizziness, cough.     IN THE ED:  Temp   99.3F ,  , BP  130/44,RR  18, SpO2 96  S/P IV sodium chloride 0.9% 1000mL bolus x1, IV ceftriaxone 1000mg x1  S/P manual bladder prolapse reduction  Labs significant for WBC 12.69, lactate 2.3  UA: orange, cloudy, small leukocyte esterase, large blood  Imaging:  CT AP: No hydronephrosis or obstructing stone. Bladder prolapse with wall thickening suggesting cystitis. Correlate with urinalysis. 74yo F w/ PMHx Parkinson's disease, anxiety/depression, HLD, bladder presents to ED c/o Mallory catheter w/ bloody urine. Per discussion w/ son at bedside, pt recently admitted at [facility] from 8/14-8/17 for urinary retention. She was discharged w/ Mallory and Bactrim. Pt's son reports that pt accidentally tugged on her Mallory and displaced it on the evening of 8/18. Subsequently, pt's son reports that pt was producing blood-tinged urine. On 8/19 AM, pt's son reports that pt seemed more confused, unable to accurately recall where she was, thus prompting him to bring pt to hospital. Pt denies CP, SOB, N/V/D/C, abdominal pain, HA, dizziness, cough.     IN THE ED:  Temp   99.3F ,  , BP  130/44,RR  18, SpO2 96  S/P IV sodium chloride 0.9% 1000mL bolus x1, IV ceftriaxone 1000mg x1  S/P manual bladder prolapse reduction  Labs significant for WBC 12.69, lactate 2.3  UA: orange, cloudy, small leukocyte esterase, large blood  Imaging:  CT AP: No hydronephrosis or obstructing stone. Bladder prolapse with wall thickening suggesting cystitis. Correlate with urinalysis. 74yo F w/ PMHx Parkinson's disease, anxiety/depression, HLD, bladder presents to ED c/o Mallory catheter w/ bloody urine. Per discussion w/ son at bedside, pt recently admitted at [facility] from 8/14-8/17 for urinary retention. She was discharged w/ Mallory and Bactrim. Pt's son reports that pt accidentally tugged on her Mallory and displaced it on the evening of 8/18. Subsequently, pt's son reports that pt was producing blood-tinged urine. On 8/19 AM, pt's son reports that pt seemed more confused, unable to accurately recall where she was, thus prompting him to bring pt to hospital. Pt denies CP, SOB, N/V/D/C, abdominal pain, HA, dizziness, cough.     IN THE ED:  Temp 99.3F  /44 RR 18 SpO2 96  S/P IV sodium chloride 0.9% 1000mL bolus x1, IV ceftriaxone 1000mg x1  S/P manual bladder prolapse reduction  Labs significant for WBC 12.69, lactate 2.3  UA: orange, cloudy, small leukocyte esterase, large blood  CT AP: No hydronephrosis or obstructing stone. Bladder prolapse with wall thickening suggesting cystitis. Correlate with urinalysis. 74yo F w/ PMHx Parkinson's disease, anxiety/depression, HLD, bladder presents to ED c/o Mallory catheter w/ bloody urine. Per discussion w/ son at bedside, pt recently admitted at [facility] from 8/14-8/17 for urinary retention. She was discharged w/ Mallory and Bactrim. Pt's son reports that pt accidentally tugged on her Mallory and displaced it on the evening of 8/18. Subsequently, pt's son reports that pt was producing blood-tinged urine. On 8/19 AM, pt's son reports that pt seemed more confused, unable to accurately recall where she was, thus prompting him to bring pt to hospital. Pt denies CP, SOB, N/V/D/C, abdominal pain, HA, dizziness, cough.     IN THE ED:  Temp 99.3F  /44 RR 18 SpO2 96  S/P IV sodium chloride 0.9% 1000mL bolus x1, IV ceftriaxone 1000mg x1  S/P manual bladder prolapse reduction  Labs significant for WBC 12.69, lactate 2.3  UA: orange, cloudy, small leukocyte esterase, large blood  CT AP: No hydronephrosis or obstructing stone. Bladder prolapse with wall thickening suggesting cystitis. Correlate with urinalysis. 74yo F w/ PMHx Parkinson's disease, anxiety/depression, HLD, bladder presents to ED c/o Mallory catheter w/ bloody urine. Per discussion w/ son at bedside, pt recently admitted at [facility] from 8/14-8/17 for urinary retention. She was discharged w/ Mallory and Bactrim. Pt's son reports that pt accidentally tugged on her Mallory and displaced it on the evening of 8/18. Subsequently, pt's son reports that pt was producing blood-tinged urine. On 8/19 AM, pt's son reports that pt seemed more confused, unable to accurately recall where she was, thus prompting him to bring pt to hospital. Pt denies CP, SOB, N/V/D/C, abdominal pain, HA, dizziness, cough.     IN THE ED:  Temp 99.3F  /44 RR 18 SpO2 96  S/P IV sodium chloride 0.9% 1000mL bolus x1, IV ceftriaxone 1000mg x1  S/P manual bladder prolapse reduction  Labs significant for WBC 12.69, lactate 2.3  UA: orange, cloudy, small leukocyte esterase, large blood  CT AP: No hydronephrosis or obstructing stone. Bladder prolapse with wall thickening suggesting cystitis. Correlate with urinalysis.

## 2023-08-20 NOTE — H&P ADULT - PROBLEM SELECTOR PLAN 1
- pt met sepsis criteria on admission, likely 2/2 UTI: , WBC 12.69  - CT AP: No hydronephrosis or obstructing stone. Bladder prolapse with wall thickening suggesting cystitis. Correlate with urinalysis.  - UA: orange, cloudy, small leukocyte esterase, large blood  - S/P IV sodium chloride 0.9% 1000mL bolus x1, IV ceftriaxone 1000mg x1  - f/u repeat lactate  - f/u blood cx x2, urine cx  - consult ID, f/u recs - pt met sepsis criteria on admission, likely 2/2 UTI: , WBC 12.69  - CT AP: No hydronephrosis or obstructing stone. Bladder prolapse with wall thickening suggesting cystitis. Correlate with urinalysis.  - UA: orange, cloudy, small leukocyte esterase, large blood  - S/P IV sodium chloride 0.9% 1000mL bolus x1, IV ceftriaxone 1000mg x1  - f/u repeat lactate  - f/u blood cx x2, urine cx  - c/w IV ertapenem   - consult ID, f/u recs Suspect cystitis with hematuria refractory to outpatient abx  - pt met sepsis criteria on admission, likely 2/2 UTI: , WBC 12.69  - CT AP: No hydronephrosis or obstructing stone. Bladder prolapse with wall thickening suggesting cystitis. Correlate with urinalysis.  - UA: orange, cloudy, small leukocyte esterase, large blood  - S/P IV sodium chloride 0.9% 1000mL bolus x1, IV ceftriaxone 1000mg x1  - f/u repeat lactate  - f/u blood cx x2, urine cx - would be helpful to have urine cx from [facility]  - c/w IV ertapenem (prior urine cultures with >3 org) as pt was treated with abx and now worsening  - consult ID, f/u recs Suspect cystitis with hematuria refractory to outpatient abx  - pt met sepsis criteria on admission, likely 2/2 UTI: , WBC 12.69  - CT AP: No hydronephrosis or obstructing stone. Bladder prolapse with wall thickening suggesting cystitis. Correlate with urinalysis.  - UA: orange, cloudy, small leukocyte esterase, large blood  - S/P IV sodium chloride 0.9% 1000mL bolus x1, IV ceftriaxone 1000mg x1  - f/u repeat lactate  - f/u blood cx x2, urine cx - would be helpful to have urine cx from [facility]  - c/w IV ertapenem (prior urine cultures with >3 org) as pt was treated with abx and now worsening  - consult ID, f/u recs Suspect cystitis with hematuria refractory to outpatient abx  - pt met sepsis criteria on admission, likely 2/2 UTI: , WBC 12.69  - CT AP: No hydronephrosis or obstructing stone. Bladder prolapse with wall thickening suggesting cystitis. Correlate with urinalysis.  - UA: orange, cloudy, small leukocyte esterase, large blood  - S/P IV sodium chloride 0.9% 1000mL bolus x1, IV ceftriaxone 1000mg x1  - f/u repeat lactate  - f/u blood cx x2, urine cx - would be helpful to have urine cx from [facility]  - c/w IV ertapenem (prior urine cultures with >3 org) as pt was treated with abx and now worsening  - consult ID, f/u recs

## 2023-08-20 NOTE — PROGRESS NOTE ADULT - PROBLEM SELECTOR PLAN 1
Suspect cystitis with hematuria refractory to outpatient abx  - pt met sepsis criteria on admission, likely 2/2 UTI: , WBC 12.69  - CT AP: No hydronephrosis or obstructing stone. Bladder prolapse with wall thickening suggesting cystitis. Correlate with urinalysis.  - UA: orange, cloudy, small leukocyte esterase, large blood  - f/u blood cx x2, urine cx - would be helpful to have urine cx from [facility]  - c/w IV ertapenem (prior urine cultures with >3 org) as pt was treated with abx and now worsening  - ID (Dr. Noyola) consulted, recs appreciated Suspect cystitis with hematuria refractory to outpatient abx  - pt met sepsis criteria on admission, likely 2/2 UTI: , WBC 12.69  - CT AP: No hydronephrosis or obstructing stone. Bladder prolapse with wall thickening suggesting cystitis. Correlate with urinalysis.  - UA: orange, cloudy, small leukocyte esterase, large blood  - f/u blood cx x2, urine cx - would be helpful to have urine cx from [facility]  - c/w IV ertapenem (prior urine cultures with >3 org) as pt was treated with abx and now worsening  - ID (Dr. Noyola) consulted, recs appreciated Suspect cystitis with hematuria refractory to outpatient abx  - pt met sepsis criteria on admission, likely 2/2 UTI: , WBC 12.69  - CT AP: No hydronephrosis or obstructing stone. Bladder prolapse with wall thickening suggesting cystitis. Correlate with urinalysis.  - UA: orange, cloudy, small leukocyte esterase, large blood  - f/u blood cx x2, urine cx - would be helpful to have urine cx from [facility]  - c/w IV ertapenem (prior urine cultures with >3 org) as pt was treated with abx and now worsening  - ID (Dr. Noyola) consulted, recs appreciated

## 2023-08-20 NOTE — H&P ADULT - ASSESSMENT
74yo F w/ PMHx Parkinson's disease, anxiety/depression, HLD presents to ED c/o Mallory catheter w/ bloody urine. Admitted for sepsis likely 2/2 UTI.       72yo F w/ PMHx Parkinson's disease, anxiety/depression, HLD presents to ED c/o Mallory catheter w/ bloody urine. Admitted for sepsis likely 2/2 UTI.       72yo F w/ PMHx Parkinson's disease, anxiety/depression, HLD presents to ED c/o Mallory catheter w/ bloody urine. Admitted for sepsis and metabolic encephalopathy likely 2/2 UTI refractory to outpatient antibiotics. 74yo F w/ PMHx Parkinson's disease, anxiety/depression, HLD presents to ED c/o Mallory catheter w/ bloody urine. Admitted for sepsis and metabolic encephalopathy likely 2/2 UTI refractory to outpatient antibiotics.

## 2023-08-20 NOTE — H&P ADULT - NSICDXPASTMEDICALHX_GEN_ALL_CORE_FT
PAST MEDICAL HISTORY:  Anxiety and depression     History of prolapse of bladder     Parkinson's disease      PAST MEDICAL HISTORY:  Anxiety and depression     History of prolapse of bladder     Hyperlipidemia     Parkinson's disease

## 2023-08-20 NOTE — CONSULT NOTE ADULT - ASSESSMENT
73y  Female with h/o Parkinson's disease, anxiety/depression, HLD, bladder prolapse. Patient presents to ED with c/o Mallory catheter w/ bloody urine. Patient is a poor historian. Patient was recently admitted at [facility] from 8/14-8/17 for urinary retention. She was discharged w/ Mallory and Bactrim. Patient accidentally tugged on her Mallory and displaced it on the evening of 8/18 which was followed by blood-tinged urine. Patient became more confused on 8/19  so her son brought her to the ER. In the ER patient is afebrile, leukocytosis to 12.69k. CT A/P reporting No hydronephrosis or obstructing stone. Bladder prolapse with wall thickening suggesting cystitis. Patient was started on ertapenem.       Hematuria   Leukocytosis  Urinary retention s/p Mallory   PCN allergy     - Blood cultures pending  - RVP/COVID 19 PCR 8/19 negative   - Urine culture pending   - CT A/P reporting bladder prolapse  - UA not very strongly suggestive of UTI   - Procalcitonin level ordered   - Continue Ertapenem  - Tolerated Ceftriaxone in the ER with no allergic reaction.   - Follow up cultures  - Trend Fever  - Trend WBC      Thank you for allowing me to participate in the care of your patient.   Will Follow    I will be available on Teams for any questions.       73y  Female with h/o Parkinson's disease, anxiety/depression, HLD, bladder prolapse. Patient presents to ED with c/o Mallory catheter w/ bloody urine. Patient is a poor historian. Patient was recently admitted at [facility] from 8/14-8/17 for urinary retention. She was discharged w/ Mallory and Bactrim. Patient accidentally tugged on her Mallory and displaced it on the evening of 8/18 which was followed by blood-tinged urine. Patient became more confused on 8/19  so her son brought her to the ER. In the ER patient is afebrile, leukocytosis to 12.69k. CT A/P reporting No hydronephrosis or obstructing stone. Bladder prolapse with wall thickening suggesting cystitis. Patient was started on ertapenem.       Hematuria   Leukocytosis  Urinary retention s/p Mallory   PCN allergy     - Blood cultures pending  - RVP/COVID 19 PCR 8/19 negative   - Urine culture pending   - CT A/P reporting bladder prolapse  - UA not very strongly suggestive of UTI   - Procalcitonin level ordered   - Continue Ertapenem  - Tolerated Ceftriaxone in the ER with no allergic reaction.   - Follow up cultures  - Trend Fever  - Trend WBC      Thank you for allowing me to participate in the care of your patient.   Will Follow    I will be available on Teams for any questions.       73y  Female with h/o Parkinson's disease, anxiety/depression, HLD, bladder prolapse. Patient presents to ED with c/o Mallory catheter w/ bloody urine. Patient is a poor historian. Patient was recently admitted at [facility] from 8/14-8/17 for urinary retention. She was discharged w/ Mallory and Bactrim. Patient accidentally tugged on her Mallory and displaced it on the evening of 8/18 which was followed by blood-tinged urine. Patient became more confused on 8/19  so her son brought her to the ER. In the ER patient is afebrile, leukocytosis to 12.69k. CT A/P reporting No hydronephrosis or obstructing stone. Bladder prolapse with wall thickening suggesting cystitis. Patient was started on ertapenem.       Hematuria   Leukocytosis  Urinary retention s/p Mallory   PCN allergy     - Blood cultures pending  - RVP/COVID 19 PCR 8/19 negative   - Urine culture pending   - CT A/P reporting bladder prolapse  - UA not very strongly suggestive of UTI   - Procalcitonin level ordered   - Continue Ertapenem  - Tolerated Ceftriaxone in the ER with no allergic reaction.   - Follow up cultures  - Trend Fever  - Trend WBC      Thank you for allowing me to participate in the care of your patient.   Will Follow    I will be available on Teams for any questions.

## 2023-08-20 NOTE — PROGRESS NOTE ADULT - ASSESSMENT
72yo F w/ PMHx Parkinson's disease, anxiety/depression, HLD presents to ED c/o Mallory catheter w/ bloody urine. Admitted for sepsis and metabolic encephalopathy likely 2/2 UTI refractory to outpatient antibiotics. 74yo F w/ PMHx Parkinson's disease, anxiety/depression, HLD presents to ED c/o Mallory catheter w/ bloody urine. Admitted for sepsis and metabolic encephalopathy likely 2/2 UTI refractory to outpatient antibiotics.

## 2023-08-20 NOTE — H&P ADULT - NSHPSOCIALHISTORY_GEN_ALL_CORE
Denies tobacco, alcohol, drug use  Lives w/ son and   Requires assistance w/ ambulation  Independently performs ADLs

## 2023-08-20 NOTE — PATIENT PROFILE ADULT - FALL HARM RISK - HARM RISK INTERVENTIONS
Assistance with ambulation/Assistance OOB with selected safe patient handling equipment/Communicate Risk of Fall with Harm to all staff/Discuss with provider need for PT consult/Monitor gait and stability/Reinforce activity limits and safety measures with patient and family/Tailored Fall Risk Interventions/Visual Cue: Yellow wristband and red socks/Bed in lowest position, wheels locked, appropriate side rails in place/Call bell, personal items and telephone in reach/Instruct patient to call for assistance before getting out of bed or chair/Non-slip footwear when patient is out of bed/[place] to call system/Physically safe environment - no spills, clutter or unnecessary equipment/Purposeful Proactive Rounding/Room/bathroom lighting operational, light cord in reach Assistance with ambulation/Assistance OOB with selected safe patient handling equipment/Communicate Risk of Fall with Harm to all staff/Discuss with provider need for PT consult/Monitor gait and stability/Reinforce activity limits and safety measures with patient and family/Tailored Fall Risk Interventions/Visual Cue: Yellow wristband and red socks/Bed in lowest position, wheels locked, appropriate side rails in place/Call bell, personal items and telephone in reach/Instruct patient to call for assistance before getting out of bed or chair/Non-slip footwear when patient is out of bed/[place] to call system/Physically safe environment - no spills, clutter or unnecessary equipment/Purposeful Proactive Rounding/Room/bathroom lighting operational, light cord in reach Assistance with ambulation/Assistance OOB with selected safe patient handling equipment/Communicate Risk of Fall with Harm to all staff/Discuss with provider need for PT consult/Monitor gait and stability/Reinforce activity limits and safety measures with patient and family/Tailored Fall Risk Interventions/Visual Cue: Yellow wristband and red socks/Bed in lowest position, wheels locked, appropriate side rails in place/Call bell, personal items and telephone in reach/Instruct patient to call for assistance before getting out of bed or chair/Non-slip footwear when patient is out of bed/[place] to call system/Physically safe environment - no spills, clutter or unnecessary equipment/Purposeful Proactive Rounding/Room/bathroom lighting operational, light cord in reach

## 2023-08-20 NOTE — ED ADULT NURSE NOTE - NSICDXPASTMEDICALHX_GEN_ALL_CORE_FT
PAST MEDICAL HISTORY:  Anxiety and depression     History of prolapse of bladder     Hyperlipidemia     Parkinson's disease

## 2023-08-20 NOTE — CONSULT NOTE ADULT - SUBJECTIVE AND OBJECTIVE BOX
[facility]  INFECTIOUS DISEASES   [address] [place] [address]  Tel: [phone]     Fax: [phone]  =======================================================      N-[id number]  VELIA COELHO    CC: Patient is a 73y old  Female who presents with a chief complaint of UTI, sepsis (20 Aug 2023 01:07)      73y  Female with h/o Parkinson's disease, anxiety/depression, HLD, bladder prolapse. Patient presents to ED with c/o Mallory catheter w/ bloody urine. Patient is a poor historian. Patient was recently admitted at [facility] from 8/14-8/17 for urinary retention. She was discharged w/ Mallory and Bactrim. Patient accidentally tugged on her Mallory and displaced it on the evening of 8/18 which was followed by blood-tinged urine. Patient became more confused on 8/19  so her son brought her to the ER. In the ER patient is afebrile, leukocytosis to 12.69k. CT A/P reporting No hydronephrosis or obstructing stone. Bladder prolapse with wall thickening suggesting cystitis. Patient was started on ertapenem. ID input requested.       Past Medical & Surgical Hx:  Parkinson's disease  Anxiety and depression  History of prolapse of bladder  Hyperlipidemia  No significant past surgical history      Social Hx:  No h/o Smoking       FAMILY HISTORY:  FH: CAD (coronary artery disease) (Father)  FH: HTN (hypertension) (Father)      Allergies  penicillin (Unknown)      REVIEW OF SYSTEMS:  CONSTITUTIONAL:  No Fever or chills  HEENT:  No diplopia or blurred vision.  No earache, sore throat or runny nose.  CARDIOVASCULAR:  No chest pain  RESPIRATORY:  No cough, shortness of breath  GASTROINTESTINAL:  No nausea, vomiting or diarrhea.  GENITOURINARY:  No dysuria, frequency or urgency. No Blood in urine  MUSCULOSKELETAL:  no joint aches, no muscle pain  SKIN:  No change in skin, hair or nails.  NEUROLOGIC:  No Headaches, seizures  PSYCHIATRIC:  No disorder of thought or mood.  ENDOCRINE:  No heat or cold intolerance  HEMATOLOGICAL:  No easy bruising or bleeding.       Physical Exam:  GEN: NAD  HEENT: normocephalic and atraumatic. EOMI. PERRL.    NECK: Supple.   LUNGS: CTA B/L.  HEART: RRR  ABDOMEN: Soft, NT, ND.  +BS.    : No CVA tenderness  EXTREMITIES: Without  edema.  MSK: No joint swelling  NEUROLOGIC: No Focal Deficits   PSYCHIATRIC: Appropriate affect .  SKIN: No rash        Weight (kg): 51 (08-20 @ 02:45)    Vitals:  T(F): 99.3 (20 Aug 2023 05:30), Max: 99.3 (19 Aug 2023 21:10)  HR: 89 (20 Aug 2023 05:30)  BP: 125/61 (20 Aug 2023 05:30)  RR: 17 (20 Aug 2023 05:30)  SpO2: 96% (20 Aug 2023 05:30) (93% - 98%)  temp max in last 48H T(F): , Max: 99.3 (08-19-23 @ 21:10)    Current Antibiotics:  ertapenem  IVPB        Other medications:  atorvastatin 10 milliGRAM(s) Oral at bedtime  baclofen 10 milliGRAM(s) Oral every 8 hours  carbidopa/levodopa  25/250 1 Tablet(s) Oral four times a day  enoxaparin Injectable 40 milliGRAM(s) SubCutaneous every 24 hours  ergocalciferol 70569 Unit(s) Oral every week  megestrol Suspension 400 milliGRAM(s) Oral daily  mirtazapine 30 milliGRAM(s) Oral at bedtime  sertraline 150 milliGRAM(s) Oral daily                            10.3   9.58  )-----------( 269      ( 20 Aug 2023 08:00 )             32.4     08-20    143  |  115<H>  |  18  ----------------------------<  87  3.8   |  20<L>  |  0.58    Ca    7.7<L>      20 Aug 2023 08:00    TPro  6.4  /  Alb  2.7<L>  /  TBili  0.6  /  DBili  x   /  AST  75<H>  /  ALT  8<L>  /  AlkPhos  74  08-20    RECENT CULTURES:      WBC Count: 9.58 K/uL (08-20-23 @ 08:00)  WBC Count: 12.69 K/uL (08-19-23 @ 22:05)    Creatinine: 0.58 mg/dL (08-20-23 @ 08:00)  Creatinine: 1.00 mg/dL (08-19-23 @ 22:05)    COVID-19 PCR: NotDetec (08-19-23 @ 22:05)    Urinalysis (08.20.23 @ 00:20)    Glucose Qualitative, Urine: Negative mg/dL   Blood, Urine: Large   pH Urine: 5.5   Color: Orange   Urine Appearance: Cloudy   Bilirubin: Negative   Ketone - Urine: Negative mg/dL   Specific Gravity: 1.025   Protein, Urine: 100 mg/dL   Urobilinogen: 1.0 mg/dL   Nitrite: Negative   Leukocyte Esterase Concentration: Small  Urine Microscopic-Add On (NC) (08.20.23 @ 00:20)    Red Blood Cell - Urine: >50 /HPF   White Blood Cell - Urine: 0-2 /HPF   Calcium Oxalate Crystals: Present   Bacteria: Few /HPF   Squamous Epithelial Cells: Present        < from: CT Abdomen and Pelvis No Cont (08.19.23 @ 22:37) >  ACC: [id number] EXAM:  CT ABDOMEN AND PELVIS   ORDERED BY: ARTHUR BRYANT     PROCEDURE DATE:  08/19/2023      INTERPRETATION:  CLINICAL INFORMATION: Low-grade fever, confusion, and   urinary catheter placement    COMPARISON: None.    CONTRAST/COMPLICATIONS:  IV Contrast: NONE  Oral Contrast: NONE  Complications: None reported at time of study completion    PROCEDURE:  CT of the Abdomen and Pelvis was performed.  Sagittal and coronal reformats were performed.    FINDINGS:  LOWER CHEST: Within normallimits.  Evaluation of the solid abdominal organs is limited without IV contrast.  LIVER: Scattered foci of calcifications may represent previous   granulomatous disease. There is a 2.6 cm cyst in segment 7.  BILE DUCTS: Normal caliber.  GALLBLADDER:Contracted.  SPLEEN: Within normal limits.  PANCREAS: Within normal limits.  ADRENALS: Within normal limits.  KIDNEYS/URETERS: No hydronephrosis. Left exophytic and peripelvic cysts.   Punctate nonobstructive left renal stone.    BLADDER: Incompletely distended with bladder prolapse. There is apparent   wall thickening suggesting cystitis.  REPRODUCTIVE ORGANS: Hysterectomy.    BOWEL: Small hiatal hernia. No bowel obstruction. Appendix is   unremarkable. No convincing focal bowel wall thickening or diverticulitis.  PERITONEUM: No ascites.  VESSELS: Aorta is not dilated. Moderate atherosclerotic vascular   calcification.  RETROPERITONEUM/LYMPH NODES: No lymphadenopathy.  ABDOMINAL WALL: Within normal limits.  BONES: Mild levoscoliosis of the lumbar spine.    IMPRESSION:  No hydronephrosis or obstructing stone.    Bladder prolapse with wall thickening suggesting cystitis. Correlate with   urinalysis.    --- End of Report ---  < end of copied text >         [facility]  INFECTIOUS DISEASES   [address] [place] [address]  Tel: [phone]     Fax: [phone]  =======================================================      N-[id number]  VELIA COELHO    CC: Patient is a 73y old  Female who presents with a chief complaint of UTI, sepsis (20 Aug 2023 01:07)      73y  Female with h/o Parkinson's disease, anxiety/depression, HLD, bladder prolapse. Patient presents to ED with c/o Mallory catheter w/ bloody urine. Patient is a poor historian. Patient was recently admitted at [facility] from 8/14-8/17 for urinary retention. She was discharged w/ Mallory and Bactrim. Patient accidentally tugged on her Mallory and displaced it on the evening of 8/18 which was followed by blood-tinged urine. Patient became more confused on 8/19  so her son brought her to the ER. In the ER patient is afebrile, leukocytosis to 12.69k. CT A/P reporting No hydronephrosis or obstructing stone. Bladder prolapse with wall thickening suggesting cystitis. Patient was started on ertapenem. ID input requested.       Past Medical & Surgical Hx:  Parkinson's disease  Anxiety and depression  History of prolapse of bladder  Hyperlipidemia  No significant past surgical history      Social Hx:  No h/o Smoking       FAMILY HISTORY:  FH: CAD (coronary artery disease) (Father)  FH: HTN (hypertension) (Father)      Allergies  penicillin (Unknown)      REVIEW OF SYSTEMS:  CONSTITUTIONAL:  No Fever or chills  HEENT:  No diplopia or blurred vision.  No earache, sore throat or runny nose.  CARDIOVASCULAR:  No chest pain  RESPIRATORY:  No cough, shortness of breath  GASTROINTESTINAL:  No nausea, vomiting or diarrhea.  GENITOURINARY:  No dysuria, frequency or urgency. No Blood in urine  MUSCULOSKELETAL:  no joint aches, no muscle pain  SKIN:  No change in skin, hair or nails.  NEUROLOGIC:  No Headaches, seizures  PSYCHIATRIC:  No disorder of thought or mood.  ENDOCRINE:  No heat or cold intolerance  HEMATOLOGICAL:  No easy bruising or bleeding.       Physical Exam:  GEN: NAD  HEENT: normocephalic and atraumatic. EOMI. PERRL.    NECK: Supple.   LUNGS: CTA B/L.  HEART: RRR  ABDOMEN: Soft, NT, ND.  +BS.    : No CVA tenderness  EXTREMITIES: Without  edema.  MSK: No joint swelling  NEUROLOGIC: No Focal Deficits   PSYCHIATRIC: Appropriate affect .  SKIN: No rash        Weight (kg): 51 (08-20 @ 02:45)    Vitals:  T(F): 99.3 (20 Aug 2023 05:30), Max: 99.3 (19 Aug 2023 21:10)  HR: 89 (20 Aug 2023 05:30)  BP: 125/61 (20 Aug 2023 05:30)  RR: 17 (20 Aug 2023 05:30)  SpO2: 96% (20 Aug 2023 05:30) (93% - 98%)  temp max in last 48H T(F): , Max: 99.3 (08-19-23 @ 21:10)    Current Antibiotics:  ertapenem  IVPB        Other medications:  atorvastatin 10 milliGRAM(s) Oral at bedtime  baclofen 10 milliGRAM(s) Oral every 8 hours  carbidopa/levodopa  25/250 1 Tablet(s) Oral four times a day  enoxaparin Injectable 40 milliGRAM(s) SubCutaneous every 24 hours  ergocalciferol 50844 Unit(s) Oral every week  megestrol Suspension 400 milliGRAM(s) Oral daily  mirtazapine 30 milliGRAM(s) Oral at bedtime  sertraline 150 milliGRAM(s) Oral daily                            10.3   9.58  )-----------( 269      ( 20 Aug 2023 08:00 )             32.4     08-20    143  |  115<H>  |  18  ----------------------------<  87  3.8   |  20<L>  |  0.58    Ca    7.7<L>      20 Aug 2023 08:00    TPro  6.4  /  Alb  2.7<L>  /  TBili  0.6  /  DBili  x   /  AST  75<H>  /  ALT  8<L>  /  AlkPhos  74  08-20    RECENT CULTURES:      WBC Count: 9.58 K/uL (08-20-23 @ 08:00)  WBC Count: 12.69 K/uL (08-19-23 @ 22:05)    Creatinine: 0.58 mg/dL (08-20-23 @ 08:00)  Creatinine: 1.00 mg/dL (08-19-23 @ 22:05)    COVID-19 PCR: NotDetec (08-19-23 @ 22:05)    Urinalysis (08.20.23 @ 00:20)    Glucose Qualitative, Urine: Negative mg/dL   Blood, Urine: Large   pH Urine: 5.5   Color: Orange   Urine Appearance: Cloudy   Bilirubin: Negative   Ketone - Urine: Negative mg/dL   Specific Gravity: 1.025   Protein, Urine: 100 mg/dL   Urobilinogen: 1.0 mg/dL   Nitrite: Negative   Leukocyte Esterase Concentration: Small  Urine Microscopic-Add On (NC) (08.20.23 @ 00:20)    Red Blood Cell - Urine: >50 /HPF   White Blood Cell - Urine: 0-2 /HPF   Calcium Oxalate Crystals: Present   Bacteria: Few /HPF   Squamous Epithelial Cells: Present        < from: CT Abdomen and Pelvis No Cont (08.19.23 @ 22:37) >  ACC: [id number] EXAM:  CT ABDOMEN AND PELVIS   ORDERED BY: ARTHUR BRYANT     PROCEDURE DATE:  08/19/2023      INTERPRETATION:  CLINICAL INFORMATION: Low-grade fever, confusion, and   urinary catheter placement    COMPARISON: None.    CONTRAST/COMPLICATIONS:  IV Contrast: NONE  Oral Contrast: NONE  Complications: None reported at time of study completion    PROCEDURE:  CT of the Abdomen and Pelvis was performed.  Sagittal and coronal reformats were performed.    FINDINGS:  LOWER CHEST: Within normallimits.  Evaluation of the solid abdominal organs is limited without IV contrast.  LIVER: Scattered foci of calcifications may represent previous   granulomatous disease. There is a 2.6 cm cyst in segment 7.  BILE DUCTS: Normal caliber.  GALLBLADDER:Contracted.  SPLEEN: Within normal limits.  PANCREAS: Within normal limits.  ADRENALS: Within normal limits.  KIDNEYS/URETERS: No hydronephrosis. Left exophytic and peripelvic cysts.   Punctate nonobstructive left renal stone.    BLADDER: Incompletely distended with bladder prolapse. There is apparent   wall thickening suggesting cystitis.  REPRODUCTIVE ORGANS: Hysterectomy.    BOWEL: Small hiatal hernia. No bowel obstruction. Appendix is   unremarkable. No convincing focal bowel wall thickening or diverticulitis.  PERITONEUM: No ascites.  VESSELS: Aorta is not dilated. Moderate atherosclerotic vascular   calcification.  RETROPERITONEUM/LYMPH NODES: No lymphadenopathy.  ABDOMINAL WALL: Within normal limits.  BONES: Mild levoscoliosis of the lumbar spine.    IMPRESSION:  No hydronephrosis or obstructing stone.    Bladder prolapse with wall thickening suggesting cystitis. Correlate with   urinalysis.    --- End of Report ---  < end of copied text >         [facility]  INFECTIOUS DISEASES   [address] [place] [address]  Tel: [phone]     Fax: [phone]  =======================================================      N-[id number]  VELIA COELHO    CC: Patient is a 73y old  Female who presents with a chief complaint of UTI, sepsis (20 Aug 2023 01:07)      73y  Female with h/o Parkinson's disease, anxiety/depression, HLD, bladder prolapse. Patient presents to ED with c/o Mallory catheter w/ bloody urine. Patient is a poor historian. Patient was recently admitted at [facility] from 8/14-8/17 for urinary retention. She was discharged w/ Mallory and Bactrim. Patient accidentally tugged on her Mallory and displaced it on the evening of 8/18 which was followed by blood-tinged urine. Patient became more confused on 8/19  so her son brought her to the ER. In the ER patient is afebrile, leukocytosis to 12.69k. CT A/P reporting No hydronephrosis or obstructing stone. Bladder prolapse with wall thickening suggesting cystitis. Patient was started on ertapenem. ID input requested.       Past Medical & Surgical Hx:  Parkinson's disease  Anxiety and depression  History of prolapse of bladder  Hyperlipidemia  No significant past surgical history      Social Hx:  No h/o Smoking       FAMILY HISTORY:  FH: CAD (coronary artery disease) (Father)  FH: HTN (hypertension) (Father)      Allergies  penicillin (Unknown)      REVIEW OF SYSTEMS:  CONSTITUTIONAL:  No Fever or chills  HEENT:  No diplopia or blurred vision.  No earache, sore throat or runny nose.  CARDIOVASCULAR:  No chest pain  RESPIRATORY:  No cough, shortness of breath  GASTROINTESTINAL:  No nausea, vomiting or diarrhea.  GENITOURINARY:  No dysuria, frequency or urgency. No Blood in urine  MUSCULOSKELETAL:  no joint aches, no muscle pain  SKIN:  No change in skin, hair or nails.  NEUROLOGIC:  No Headaches, seizures  PSYCHIATRIC:  No disorder of thought or mood.  ENDOCRINE:  No heat or cold intolerance  HEMATOLOGICAL:  No easy bruising or bleeding.       Physical Exam:  GEN: NAD  HEENT: normocephalic and atraumatic. EOMI. PERRL.    NECK: Supple.   LUNGS: CTA B/L.  HEART: RRR  ABDOMEN: Soft, NT, ND.  +BS.    : No CVA tenderness  EXTREMITIES: Without  edema.  MSK: No joint swelling  NEUROLOGIC: No Focal Deficits   PSYCHIATRIC: Appropriate affect .  SKIN: No rash        Weight (kg): 51 (08-20 @ 02:45)    Vitals:  T(F): 99.3 (20 Aug 2023 05:30), Max: 99.3 (19 Aug 2023 21:10)  HR: 89 (20 Aug 2023 05:30)  BP: 125/61 (20 Aug 2023 05:30)  RR: 17 (20 Aug 2023 05:30)  SpO2: 96% (20 Aug 2023 05:30) (93% - 98%)  temp max in last 48H T(F): , Max: 99.3 (08-19-23 @ 21:10)    Current Antibiotics:  ertapenem  IVPB        Other medications:  atorvastatin 10 milliGRAM(s) Oral at bedtime  baclofen 10 milliGRAM(s) Oral every 8 hours  carbidopa/levodopa  25/250 1 Tablet(s) Oral four times a day  enoxaparin Injectable 40 milliGRAM(s) SubCutaneous every 24 hours  ergocalciferol 25209 Unit(s) Oral every week  megestrol Suspension 400 milliGRAM(s) Oral daily  mirtazapine 30 milliGRAM(s) Oral at bedtime  sertraline 150 milliGRAM(s) Oral daily                            10.3   9.58  )-----------( 269      ( 20 Aug 2023 08:00 )             32.4     08-20    143  |  115<H>  |  18  ----------------------------<  87  3.8   |  20<L>  |  0.58    Ca    7.7<L>      20 Aug 2023 08:00    TPro  6.4  /  Alb  2.7<L>  /  TBili  0.6  /  DBili  x   /  AST  75<H>  /  ALT  8<L>  /  AlkPhos  74  08-20    RECENT CULTURES:      WBC Count: 9.58 K/uL (08-20-23 @ 08:00)  WBC Count: 12.69 K/uL (08-19-23 @ 22:05)    Creatinine: 0.58 mg/dL (08-20-23 @ 08:00)  Creatinine: 1.00 mg/dL (08-19-23 @ 22:05)    COVID-19 PCR: NotDetec (08-19-23 @ 22:05)    Urinalysis (08.20.23 @ 00:20)    Glucose Qualitative, Urine: Negative mg/dL   Blood, Urine: Large   pH Urine: 5.5   Color: Orange   Urine Appearance: Cloudy   Bilirubin: Negative   Ketone - Urine: Negative mg/dL   Specific Gravity: 1.025   Protein, Urine: 100 mg/dL   Urobilinogen: 1.0 mg/dL   Nitrite: Negative   Leukocyte Esterase Concentration: Small  Urine Microscopic-Add On (NC) (08.20.23 @ 00:20)    Red Blood Cell - Urine: >50 /HPF   White Blood Cell - Urine: 0-2 /HPF   Calcium Oxalate Crystals: Present   Bacteria: Few /HPF   Squamous Epithelial Cells: Present        < from: CT Abdomen and Pelvis No Cont (08.19.23 @ 22:37) >  ACC: [id number] EXAM:  CT ABDOMEN AND PELVIS   ORDERED BY: ARTHUR BRYANT     PROCEDURE DATE:  08/19/2023      INTERPRETATION:  CLINICAL INFORMATION: Low-grade fever, confusion, and   urinary catheter placement    COMPARISON: None.    CONTRAST/COMPLICATIONS:  IV Contrast: NONE  Oral Contrast: NONE  Complications: None reported at time of study completion    PROCEDURE:  CT of the Abdomen and Pelvis was performed.  Sagittal and coronal reformats were performed.    FINDINGS:  LOWER CHEST: Within normallimits.  Evaluation of the solid abdominal organs is limited without IV contrast.  LIVER: Scattered foci of calcifications may represent previous   granulomatous disease. There is a 2.6 cm cyst in segment 7.  BILE DUCTS: Normal caliber.  GALLBLADDER:Contracted.  SPLEEN: Within normal limits.  PANCREAS: Within normal limits.  ADRENALS: Within normal limits.  KIDNEYS/URETERS: No hydronephrosis. Left exophytic and peripelvic cysts.   Punctate nonobstructive left renal stone.    BLADDER: Incompletely distended with bladder prolapse. There is apparent   wall thickening suggesting cystitis.  REPRODUCTIVE ORGANS: Hysterectomy.    BOWEL: Small hiatal hernia. No bowel obstruction. Appendix is   unremarkable. No convincing focal bowel wall thickening or diverticulitis.  PERITONEUM: No ascites.  VESSELS: Aorta is not dilated. Moderate atherosclerotic vascular   calcification.  RETROPERITONEUM/LYMPH NODES: No lymphadenopathy.  ABDOMINAL WALL: Within normal limits.  BONES: Mild levoscoliosis of the lumbar spine.    IMPRESSION:  No hydronephrosis or obstructing stone.    Bladder prolapse with wall thickening suggesting cystitis. Correlate with   urinalysis.    --- End of Report ---  < end of copied text >         [facility]  INFECTIOUS DISEASES   [address] [place] [address]  Tel: [phone]     Fax: [phone]  =======================================================      N-[id number]  VELIA COELHO    CC: Patient is a 73y old  Female who presents with a chief complaint of UTI, sepsis (20 Aug 2023 01:07)      73y  Female with h/o Parkinson's disease, anxiety/depression, HLD, bladder prolapse. Patient presents to ED with c/o Mallory catheter w/ bloody urine. Patient is a poor historian. Patient was recently admitted at [facility] from 8/14-8/17 for urinary retention. She was discharged w/ Mallory and Bactrim. Patient accidentally tugged on her Mallory and displaced it on the evening of 8/18 which was followed by blood-tinged urine. Patient became more confused on 8/19  so her son brought her to the ER. In the ER patient is afebrile, leukocytosis to 12.69k. CT A/P reporting No hydronephrosis or obstructing stone. Bladder prolapse with wall thickening suggesting cystitis. Patient was started on ertapenem. ID input requested.       Past Medical & Surgical Hx:  Parkinson's disease  Anxiety and depression  History of prolapse of bladder  Hyperlipidemia  No significant past surgical history      Social Hx:  No h/o Smoking       FAMILY HISTORY:  FH: CAD (coronary artery disease) (Father)  FH: HTN (hypertension) (Father)      Allergies  penicillin (Unknown)      REVIEW OF SYSTEMS:  unable to obtain due to medical condition       Physical Exam:  GEN: NAD  HEENT: normocephalic and atraumatic.   NECK: Supple.   LUNGS: CTA B/L.  HEART: RRR  ABDOMEN: Soft, NT, ND.  +BS.    : Mallory   EXTREMITIES: Without  edema.  MSK: No joint swelling  NEUROLOGIC: Awake, alert, answers some questions        Weight (kg): 51 (08-20 @ 02:45)    Vitals:  T(F): 99.3 (20 Aug 2023 05:30), Max: 99.3 (19 Aug 2023 21:10)  HR: 89 (20 Aug 2023 05:30)  BP: 125/61 (20 Aug 2023 05:30)  RR: 17 (20 Aug 2023 05:30)  SpO2: 96% (20 Aug 2023 05:30) (93% - 98%)  temp max in last 48H T(F): , Max: 99.3 (08-19-23 @ 21:10)    Current Antibiotics:  ertapenem  IVPB        Other medications:  atorvastatin 10 milliGRAM(s) Oral at bedtime  baclofen 10 milliGRAM(s) Oral every 8 hours  carbidopa/levodopa  25/250 1 Tablet(s) Oral four times a day  enoxaparin Injectable 40 milliGRAM(s) SubCutaneous every 24 hours  ergocalciferol 44330 Unit(s) Oral every week  megestrol Suspension 400 milliGRAM(s) Oral daily  mirtazapine 30 milliGRAM(s) Oral at bedtime  sertraline 150 milliGRAM(s) Oral daily                            10.3   9.58  )-----------( 269      ( 20 Aug 2023 08:00 )             32.4     08-20    143  |  115<H>  |  18  ----------------------------<  87  3.8   |  20<L>  |  0.58    Ca    7.7<L>      20 Aug 2023 08:00    TPro  6.4  /  Alb  2.7<L>  /  TBili  0.6  /  DBili  x   /  AST  75<H>  /  ALT  8<L>  /  AlkPhos  74  08-20    RECENT CULTURES:      WBC Count: 9.58 K/uL (08-20-23 @ 08:00)  WBC Count: 12.69 K/uL (08-19-23 @ 22:05)    Creatinine: 0.58 mg/dL (08-20-23 @ 08:00)  Creatinine: 1.00 mg/dL (08-19-23 @ 22:05)    COVID-19 PCR: NotDetec (08-19-23 @ 22:05)    Urinalysis (08.20.23 @ 00:20)    Glucose Qualitative, Urine: Negative mg/dL   Blood, Urine: Large   pH Urine: 5.5   Color: Orange   Urine Appearance: Cloudy   Bilirubin: Negative   Ketone - Urine: Negative mg/dL   Specific Gravity: 1.025   Protein, Urine: 100 mg/dL   Urobilinogen: 1.0 mg/dL   Nitrite: Negative   Leukocyte Esterase Concentration: Small  Urine Microscopic-Add On (NC) (08.20.23 @ 00:20)    Red Blood Cell - Urine: >50 /HPF   White Blood Cell - Urine: 0-2 /HPF   Calcium Oxalate Crystals: Present   Bacteria: Few /HPF   Squamous Epithelial Cells: Present        < from: CT Abdomen and Pelvis No Cont (08.19.23 @ 22:37) >  ACC: [id number] EXAM:  CT ABDOMEN AND PELVIS   ORDERED BY: ARTHUR BRYANT     PROCEDURE DATE:  08/19/2023      INTERPRETATION:  CLINICAL INFORMATION: Low-grade fever, confusion, and   urinary catheter placement    COMPARISON: None.    CONTRAST/COMPLICATIONS:  IV Contrast: NONE  Oral Contrast: NONE  Complications: None reported at time of study completion    PROCEDURE:  CT of the Abdomen and Pelvis was performed.  Sagittal and coronal reformats were performed.    FINDINGS:  LOWER CHEST: Within normallimits.  Evaluation of the solid abdominal organs is limited without IV contrast.  LIVER: Scattered foci of calcifications may represent previous   granulomatous disease. There is a 2.6 cm cyst in segment 7.  BILE DUCTS: Normal caliber.  GALLBLADDER:Contracted.  SPLEEN: Within normal limits.  PANCREAS: Within normal limits.  ADRENALS: Within normal limits.  KIDNEYS/URETERS: No hydronephrosis. Left exophytic and peripelvic cysts.   Punctate nonobstructive left renal stone.    BLADDER: Incompletely distended with bladder prolapse. There is apparent   wall thickening suggesting cystitis.  REPRODUCTIVE ORGANS: Hysterectomy.    BOWEL: Small hiatal hernia. No bowel obstruction. Appendix is   unremarkable. No convincing focal bowel wall thickening or diverticulitis.  PERITONEUM: No ascites.  VESSELS: Aorta is not dilated. Moderate atherosclerotic vascular   calcification.  RETROPERITONEUM/LYMPH NODES: No lymphadenopathy.  ABDOMINAL WALL: Within normal limits.  BONES: Mild levoscoliosis of the lumbar spine.    IMPRESSION:  No hydronephrosis or obstructing stone.    Bladder prolapse with wall thickening suggesting cystitis. Correlate with   urinalysis.    --- End of Report ---  < end of copied text >         [facility]  INFECTIOUS DISEASES   [address] [place] [address]  Tel: [phone]     Fax: [phone]  =======================================================      N-[id number]  VELIA COELHO    CC: Patient is a 73y old  Female who presents with a chief complaint of UTI, sepsis (20 Aug 2023 01:07)      73y  Female with h/o Parkinson's disease, anxiety/depression, HLD, bladder prolapse. Patient presents to ED with c/o Mallory catheter w/ bloody urine. Patient is a poor historian. Patient was recently admitted at [facility] from 8/14-8/17 for urinary retention. She was discharged w/ Mallory and Bactrim. Patient accidentally tugged on her Mallory and displaced it on the evening of 8/18 which was followed by blood-tinged urine. Patient became more confused on 8/19  so her son brought her to the ER. In the ER patient is afebrile, leukocytosis to 12.69k. CT A/P reporting No hydronephrosis or obstructing stone. Bladder prolapse with wall thickening suggesting cystitis. Patient was started on ertapenem. ID input requested.       Past Medical & Surgical Hx:  Parkinson's disease  Anxiety and depression  History of prolapse of bladder  Hyperlipidemia  No significant past surgical history      Social Hx:  No h/o Smoking       FAMILY HISTORY:  FH: CAD (coronary artery disease) (Father)  FH: HTN (hypertension) (Father)      Allergies  penicillin (Unknown)      REVIEW OF SYSTEMS:  unable to obtain due to medical condition       Physical Exam:  GEN: NAD  HEENT: normocephalic and atraumatic.   NECK: Supple.   LUNGS: CTA B/L.  HEART: RRR  ABDOMEN: Soft, NT, ND.  +BS.    : Mallory   EXTREMITIES: Without  edema.  MSK: No joint swelling  NEUROLOGIC: Awake, alert, answers some questions        Weight (kg): 51 (08-20 @ 02:45)    Vitals:  T(F): 99.3 (20 Aug 2023 05:30), Max: 99.3 (19 Aug 2023 21:10)  HR: 89 (20 Aug 2023 05:30)  BP: 125/61 (20 Aug 2023 05:30)  RR: 17 (20 Aug 2023 05:30)  SpO2: 96% (20 Aug 2023 05:30) (93% - 98%)  temp max in last 48H T(F): , Max: 99.3 (08-19-23 @ 21:10)    Current Antibiotics:  ertapenem  IVPB        Other medications:  atorvastatin 10 milliGRAM(s) Oral at bedtime  baclofen 10 milliGRAM(s) Oral every 8 hours  carbidopa/levodopa  25/250 1 Tablet(s) Oral four times a day  enoxaparin Injectable 40 milliGRAM(s) SubCutaneous every 24 hours  ergocalciferol 59615 Unit(s) Oral every week  megestrol Suspension 400 milliGRAM(s) Oral daily  mirtazapine 30 milliGRAM(s) Oral at bedtime  sertraline 150 milliGRAM(s) Oral daily                            10.3   9.58  )-----------( 269      ( 20 Aug 2023 08:00 )             32.4     08-20    143  |  115<H>  |  18  ----------------------------<  87  3.8   |  20<L>  |  0.58    Ca    7.7<L>      20 Aug 2023 08:00    TPro  6.4  /  Alb  2.7<L>  /  TBili  0.6  /  DBili  x   /  AST  75<H>  /  ALT  8<L>  /  AlkPhos  74  08-20    RECENT CULTURES:      WBC Count: 9.58 K/uL (08-20-23 @ 08:00)  WBC Count: 12.69 K/uL (08-19-23 @ 22:05)    Creatinine: 0.58 mg/dL (08-20-23 @ 08:00)  Creatinine: 1.00 mg/dL (08-19-23 @ 22:05)    COVID-19 PCR: NotDetec (08-19-23 @ 22:05)    Urinalysis (08.20.23 @ 00:20)    Glucose Qualitative, Urine: Negative mg/dL   Blood, Urine: Large   pH Urine: 5.5   Color: Orange   Urine Appearance: Cloudy   Bilirubin: Negative   Ketone - Urine: Negative mg/dL   Specific Gravity: 1.025   Protein, Urine: 100 mg/dL   Urobilinogen: 1.0 mg/dL   Nitrite: Negative   Leukocyte Esterase Concentration: Small  Urine Microscopic-Add On (NC) (08.20.23 @ 00:20)    Red Blood Cell - Urine: >50 /HPF   White Blood Cell - Urine: 0-2 /HPF   Calcium Oxalate Crystals: Present   Bacteria: Few /HPF   Squamous Epithelial Cells: Present        < from: CT Abdomen and Pelvis No Cont (08.19.23 @ 22:37) >  ACC: [id number] EXAM:  CT ABDOMEN AND PELVIS   ORDERED BY: ARTHUR BRYANT     PROCEDURE DATE:  08/19/2023      INTERPRETATION:  CLINICAL INFORMATION: Low-grade fever, confusion, and   urinary catheter placement    COMPARISON: None.    CONTRAST/COMPLICATIONS:  IV Contrast: NONE  Oral Contrast: NONE  Complications: None reported at time of study completion    PROCEDURE:  CT of the Abdomen and Pelvis was performed.  Sagittal and coronal reformats were performed.    FINDINGS:  LOWER CHEST: Within normallimits.  Evaluation of the solid abdominal organs is limited without IV contrast.  LIVER: Scattered foci of calcifications may represent previous   granulomatous disease. There is a 2.6 cm cyst in segment 7.  BILE DUCTS: Normal caliber.  GALLBLADDER:Contracted.  SPLEEN: Within normal limits.  PANCREAS: Within normal limits.  ADRENALS: Within normal limits.  KIDNEYS/URETERS: No hydronephrosis. Left exophytic and peripelvic cysts.   Punctate nonobstructive left renal stone.    BLADDER: Incompletely distended with bladder prolapse. There is apparent   wall thickening suggesting cystitis.  REPRODUCTIVE ORGANS: Hysterectomy.    BOWEL: Small hiatal hernia. No bowel obstruction. Appendix is   unremarkable. No convincing focal bowel wall thickening or diverticulitis.  PERITONEUM: No ascites.  VESSELS: Aorta is not dilated. Moderate atherosclerotic vascular   calcification.  RETROPERITONEUM/LYMPH NODES: No lymphadenopathy.  ABDOMINAL WALL: Within normal limits.  BONES: Mild levoscoliosis of the lumbar spine.    IMPRESSION:  No hydronephrosis or obstructing stone.    Bladder prolapse with wall thickening suggesting cystitis. Correlate with   urinalysis.    --- End of Report ---  < end of copied text >         [facility]  INFECTIOUS DISEASES   [address] [place] [address]  Tel: [phone]     Fax: [phone]  =======================================================      [id number]  VELIA COELHO    CC: Patient is a 73y old  Female who presents with a chief complaint of UTI, sepsis (20 Aug 2023 01:07)      73y  Female with h/o Parkinson's disease, anxiety/depression, HLD, bladder prolapse. Patient presents to ED with c/o Mallory catheter w/ bloody urine. Patient is a poor historian. Patient was recently admitted at [facility] from 8/14-8/17 for urinary retention. She was discharged w/ Mallory and Bactrim. Patient accidentally tugged on her Mallory and displaced it on the evening of 8/18 which was followed by blood-tinged urine. Patient became more confused on 8/19  so her son brought her to the ER. In the ER patient is afebrile, leukocytosis to 12.69k. CT A/P reporting No hydronephrosis or obstructing stone. Bladder prolapse with wall thickening suggesting cystitis. Patient was started on ertapenem. ID input requested.       Past Medical & Surgical Hx:  Parkinson's disease  Anxiety and depression  History of prolapse of bladder  Hyperlipidemia  No significant past surgical history      Social Hx:  No h/o Smoking       FAMILY HISTORY:  FH: CAD (coronary artery disease) (Father)  FH: HTN (hypertension) (Father)      Allergies  penicillin (Unknown)      REVIEW OF SYSTEMS:  unable to obtain due to medical condition       Physical Exam:  GEN: NAD  HEENT: normocephalic and atraumatic.   NECK: Supple.   LUNGS: CTA B/L.  HEART: RRR  ABDOMEN: Soft, NT, ND.  +BS.    : Mallory   EXTREMITIES: Without  edema.  MSK: No joint swelling  NEUROLOGIC: Awake, alert, answers some questions        Weight (kg): 51 (08-20 @ 02:45)    Vitals:  T(F): 99.3 (20 Aug 2023 05:30), Max: 99.3 (19 Aug 2023 21:10)  HR: 89 (20 Aug 2023 05:30)  BP: 125/61 (20 Aug 2023 05:30)  RR: 17 (20 Aug 2023 05:30)  SpO2: 96% (20 Aug 2023 05:30) (93% - 98%)  temp max in last 48H T(F): , Max: 99.3 (08-19-23 @ 21:10)    Current Antibiotics:  ertapenem  IVPB        Other medications:  atorvastatin 10 milliGRAM(s) Oral at bedtime  baclofen 10 milliGRAM(s) Oral every 8 hours  carbidopa/levodopa  25/250 1 Tablet(s) Oral four times a day  enoxaparin Injectable 40 milliGRAM(s) SubCutaneous every 24 hours  ergocalciferol 05477 Unit(s) Oral every week  megestrol Suspension 400 milliGRAM(s) Oral daily  mirtazapine 30 milliGRAM(s) Oral at bedtime  sertraline 150 milliGRAM(s) Oral daily                            10.3   9.58  )-----------( 269      ( 20 Aug 2023 08:00 )             32.4     08-20    143  |  115<H>  |  18  ----------------------------<  87  3.8   |  20<L>  |  0.58    Ca    7.7<L>      20 Aug 2023 08:00    TPro  6.4  /  Alb  2.7<L>  /  TBili  0.6  /  DBili  x   /  AST  75<H>  /  ALT  8<L>  /  AlkPhos  74  08-20    RECENT CULTURES:      WBC Count: 9.58 K/uL (08-20-23 @ 08:00)  WBC Count: 12.69 K/uL (08-19-23 @ 22:05)    Creatinine: 0.58 mg/dL (08-20-23 @ 08:00)  Creatinine: 1.00 mg/dL (08-19-23 @ 22:05)    COVID-19 PCR: NotDetec (08-19-23 @ 22:05)    Urinalysis (08.20.23 @ 00:20)    Glucose Qualitative, Urine: Negative mg/dL   Blood, Urine: Large   pH Urine: 5.5   Color: Orange   Urine Appearance: Cloudy   Bilirubin: Negative   Ketone - Urine: Negative mg/dL   Specific Gravity: 1.025   Protein, Urine: 100 mg/dL   Urobilinogen: 1.0 mg/dL   Nitrite: Negative   Leukocyte Esterase Concentration: Small  Urine Microscopic-Add On (NC) (08.20.23 @ 00:20)    Red Blood Cell - Urine: >50 /HPF   White Blood Cell - Urine: 0-2 /HPF   Calcium Oxalate Crystals: Present   Bacteria: Few /HPF   Squamous Epithelial Cells: Present        < from: CT Abdomen and Pelvis No Cont (08.19.23 @ 22:37) >  ACC: [id number] EXAM:  CT ABDOMEN AND PELVIS   ORDERED BY: ARTHUR BRYANT     PROCEDURE DATE:  08/19/2023      INTERPRETATION:  CLINICAL INFORMATION: Low-grade fever, confusion, and   urinary catheter placement    COMPARISON: None.    CONTRAST/COMPLICATIONS:  IV Contrast: NONE  Oral Contrast: NONE  Complications: None reported at time of study completion    PROCEDURE:  CT of the Abdomen and Pelvis was performed.  Sagittal and coronal reformats were performed.    FINDINGS:  LOWER CHEST: Within normallimits.  Evaluation of the solid abdominal organs is limited without IV contrast.  LIVER: Scattered foci of calcifications may represent previous   granulomatous disease. There is a 2.6 cm cyst in segment 7.  BILE DUCTS: Normal caliber.  GALLBLADDER:Contracted.  SPLEEN: Within normal limits.  PANCREAS: Within normal limits.  ADRENALS: Within normal limits.  KIDNEYS/URETERS: No hydronephrosis. Left exophytic and peripelvic cysts.   Punctate nonobstructive left renal stone.    BLADDER: Incompletely distended with bladder prolapse. There is apparent   wall thickening suggesting cystitis.  REPRODUCTIVE ORGANS: Hysterectomy.    BOWEL: Small hiatal hernia. No bowel obstruction. Appendix is   unremarkable. No convincing focal bowel wall thickening or diverticulitis.  PERITONEUM: No ascites.  VESSELS: Aorta is not dilated. Moderate atherosclerotic vascular   calcification.  RETROPERITONEUM/LYMPH NODES: No lymphadenopathy.  ABDOMINAL WALL: Within normal limits.  BONES: Mild levoscoliosis of the lumbar spine.    IMPRESSION:  No hydronephrosis or obstructing stone.    Bladder prolapse with wall thickening suggesting cystitis. Correlate with   urinalysis.    --- End of Report ---  < end of copied text >

## 2023-08-20 NOTE — H&P ADULT - PROBLEM SELECTOR PLAN 3
Chronic  - c/w home carbidopa-levodopa  - c/w home megestrol, Ensure supplements  - ambulate w/ assistance  - fall precautions

## 2023-08-20 NOTE — ED ADULT NURSE REASSESSMENT NOTE - NS ED NURSE REASSESS COMMENT FT1
d/w Dr. Fang, pt took own sinemet at 0100
Dr. Fang attempted to manually reduce bladder prolapse unsuccessfully.

## 2023-08-21 LAB
ALBUMIN SERPL ELPH-MCNC: 2.8 G/DL — LOW (ref 3.3–5)
ALP SERPL-CCNC: 73 U/L — SIGNIFICANT CHANGE UP (ref 40–120)
ALT FLD-CCNC: 7 U/L — LOW (ref 12–78)
ANION GAP SERPL CALC-SCNC: 9 MMOL/L — SIGNIFICANT CHANGE UP (ref 5–17)
AST SERPL-CCNC: 59 U/L — HIGH (ref 15–37)
BASOPHILS # BLD AUTO: 0.04 K/UL — SIGNIFICANT CHANGE UP (ref 0–0.2)
BASOPHILS NFR BLD AUTO: 0.5 % — SIGNIFICANT CHANGE UP (ref 0–2)
BILIRUB SERPL-MCNC: 0.5 MG/DL — SIGNIFICANT CHANGE UP (ref 0.2–1.2)
BUN SERPL-MCNC: 15 MG/DL — SIGNIFICANT CHANGE UP (ref 7–23)
CALCIUM SERPL-MCNC: 8.1 MG/DL — LOW (ref 8.5–10.1)
CHLORIDE SERPL-SCNC: 110 MMOL/L — HIGH (ref 96–108)
CO2 SERPL-SCNC: 24 MMOL/L — SIGNIFICANT CHANGE UP (ref 22–31)
CREAT SERPL-MCNC: 0.52 MG/DL — SIGNIFICANT CHANGE UP (ref 0.5–1.3)
CULTURE RESULTS: SIGNIFICANT CHANGE UP
EGFR: 97 ML/MIN/1.73M2 — SIGNIFICANT CHANGE UP
EOSINOPHIL # BLD AUTO: 0.19 K/UL — SIGNIFICANT CHANGE UP (ref 0–0.5)
EOSINOPHIL NFR BLD AUTO: 2.4 % — SIGNIFICANT CHANGE UP (ref 0–6)
GLUCOSE SERPL-MCNC: 92 MG/DL — SIGNIFICANT CHANGE UP (ref 70–99)
HCT VFR BLD CALC: 32.9 % — LOW (ref 34.5–45)
HCV AB S/CO SERPL IA: 0.09 S/CO — SIGNIFICANT CHANGE UP (ref 0–0.99)
HCV AB SERPL-IMP: SIGNIFICANT CHANGE UP
HGB BLD-MCNC: 10.7 G/DL — LOW (ref 11.5–15.5)
IMM GRANULOCYTES NFR BLD AUTO: 0.3 % — SIGNIFICANT CHANGE UP (ref 0–0.9)
LYMPHOCYTES # BLD AUTO: 1.75 K/UL — SIGNIFICANT CHANGE UP (ref 1–3.3)
LYMPHOCYTES # BLD AUTO: 22 % — SIGNIFICANT CHANGE UP (ref 13–44)
MCHC RBC-ENTMCNC: 28.2 PG — SIGNIFICANT CHANGE UP (ref 27–34)
MCHC RBC-ENTMCNC: 32.5 GM/DL — SIGNIFICANT CHANGE UP (ref 32–36)
MCV RBC AUTO: 86.8 FL — SIGNIFICANT CHANGE UP (ref 80–100)
MONOCYTES # BLD AUTO: 0.6 K/UL — SIGNIFICANT CHANGE UP (ref 0–0.9)
MONOCYTES NFR BLD AUTO: 7.5 % — SIGNIFICANT CHANGE UP (ref 2–14)
NEUTROPHILS # BLD AUTO: 5.35 K/UL — SIGNIFICANT CHANGE UP (ref 1.8–7.4)
NEUTROPHILS NFR BLD AUTO: 67.3 % — SIGNIFICANT CHANGE UP (ref 43–77)
NRBC # BLD: 0 /100 WBCS — SIGNIFICANT CHANGE UP (ref 0–0)
PLATELET # BLD AUTO: 281 K/UL — SIGNIFICANT CHANGE UP (ref 150–400)
POTASSIUM SERPL-MCNC: 3.6 MMOL/L — SIGNIFICANT CHANGE UP (ref 3.5–5.3)
POTASSIUM SERPL-SCNC: 3.6 MMOL/L — SIGNIFICANT CHANGE UP (ref 3.5–5.3)
PROT SERPL-MCNC: 6.7 G/DL — SIGNIFICANT CHANGE UP (ref 6–8.3)
RBC # BLD: 3.79 M/UL — LOW (ref 3.8–5.2)
RBC # FLD: 14.6 % — HIGH (ref 10.3–14.5)
SODIUM SERPL-SCNC: 143 MMOL/L — SIGNIFICANT CHANGE UP (ref 135–145)
SPECIMEN SOURCE: SIGNIFICANT CHANGE UP
WBC # BLD: 7.95 K/UL — SIGNIFICANT CHANGE UP (ref 3.8–10.5)
WBC # FLD AUTO: 7.95 K/UL — SIGNIFICANT CHANGE UP (ref 3.8–10.5)

## 2023-08-21 PROCEDURE — 99233 SBSQ HOSP IP/OBS HIGH 50: CPT

## 2023-08-21 RX ADMIN — ENOXAPARIN SODIUM 40 MILLIGRAM(S): 100 INJECTION SUBCUTANEOUS at 05:36

## 2023-08-21 RX ADMIN — CARBIDOPA AND LEVODOPA 1 TABLET(S): 25; 100 TABLET ORAL at 00:26

## 2023-08-21 RX ADMIN — MEGESTROL ACETATE 400 MILLIGRAM(S): 40 SUSPENSION ORAL at 14:04

## 2023-08-21 RX ADMIN — CARBIDOPA AND LEVODOPA 1 TABLET(S): 25; 100 TABLET ORAL at 14:04

## 2023-08-21 RX ADMIN — CARBIDOPA AND LEVODOPA 1 TABLET(S): 25; 100 TABLET ORAL at 05:33

## 2023-08-21 RX ADMIN — Medication 10 MILLIGRAM(S): at 05:33

## 2023-08-21 RX ADMIN — CARBIDOPA AND LEVODOPA 1 TABLET(S): 25; 100 TABLET ORAL at 18:30

## 2023-08-21 RX ADMIN — Medication 10 MILLIGRAM(S): at 14:05

## 2023-08-21 RX ADMIN — ERTAPENEM SODIUM 120 MILLIGRAM(S): 1 INJECTION, POWDER, LYOPHILIZED, FOR SOLUTION INTRAMUSCULAR; INTRAVENOUS at 05:33

## 2023-08-21 RX ADMIN — ATORVASTATIN CALCIUM 10 MILLIGRAM(S): 80 TABLET, FILM COATED ORAL at 21:07

## 2023-08-21 RX ADMIN — MIRTAZAPINE 30 MILLIGRAM(S): 45 TABLET, ORALLY DISINTEGRATING ORAL at 21:07

## 2023-08-21 RX ADMIN — CARBIDOPA AND LEVODOPA 1 TABLET(S): 25; 100 TABLET ORAL at 23:19

## 2023-08-21 RX ADMIN — SERTRALINE 150 MILLIGRAM(S): 25 TABLET, FILM COATED ORAL at 14:04

## 2023-08-21 RX ADMIN — Medication 10 MILLIGRAM(S): at 21:07

## 2023-08-21 NOTE — PHYSICAL THERAPY INITIAL EVALUATION ADULT - GENERAL OBSERVATIONS, REHAB EVAL
Patient chart reviewed, events noted. Patient received semi-reclined in bed, in NAD. Family () at bedside. Agreeable to PT at this time.

## 2023-08-21 NOTE — PROGRESS NOTE ADULT - ASSESSMENT
74yo F w/ PMHx Parkinson's disease, anxiety/depression, HLD presents to ED c/o Mallory catheter w/ bloody urine. Admitted for sepsis and metabolic encephalopathy likely 2/2 UTI refractory to outpatient antibiotics.    - CV: will continue atorvastatin 10 milliGRAM(s) Oral at bedtime  - HEME: will continue megestrol Suspension 400 milliGRAM(s) Oral daily - utilized for appetite stimulation  - ID: will continue ertapenem  IVPB 1000 milliGRAM(s) IV Intermittent every 24 hours  - MSK: will continue baclofen 10 milliGRAM(s) Oral every 8 hours  - NEURO: will continue carbidopa/levodopa  25/250 1 Tablet(s) Oral four times a day  - PSYCH: will continue mirtazapine 30 milliGRAM(s) Oral at bedtime  - PSYCH: will continue sertraline 150 milliGRAM(s) Oral daily  - SUPPLEMENT: will continue ergocalciferol 77402 Unit(s) Oral every week  - VTE PPX: will continue enoxaparin Injectable 40 milliGRAM(s) SubCutaneous every 24 hours 74yo F w/ PMHx Parkinson's disease, anxiety/depression, HLD presents to ED c/o Mallory catheter w/ bloody urine. Admitted for sepsis and metabolic encephalopathy likely 2/2 UTI refractory to outpatient antibiotics.    - CV: will continue atorvastatin 10 milliGRAM(s) Oral at bedtime  - HEME: will continue megestrol Suspension 400 milliGRAM(s) Oral daily - utilized for appetite stimulation  - ID: will continue ertapenem  IVPB 1000 milliGRAM(s) IV Intermittent every 24 hours  - MSK: will continue baclofen 10 milliGRAM(s) Oral every 8 hours  - NEURO: will continue carbidopa/levodopa  25/250 1 Tablet(s) Oral four times a day  - PSYCH: will continue mirtazapine 30 milliGRAM(s) Oral at bedtime  - PSYCH: will continue sertraline 150 milliGRAM(s) Oral daily  - SUPPLEMENT: will continue ergocalciferol 90730 Unit(s) Oral every week  - VTE PPX: will continue enoxaparin Injectable 40 milliGRAM(s) SubCutaneous every 24 hours 72yo F w/ PMHx Parkinson's disease, anxiety/depression, HLD presents to ED c/o Mallory catheter w/ bloody urine. Admitted for sepsis and metabolic encephalopathy likely 2/2 UTI refractory to outpatient antibiotics.    - CV: will continue atorvastatin 10 milliGRAM(s) Oral at bedtime  - HEME: will continue megestrol Suspension 400 milliGRAM(s) Oral daily - utilized for appetite stimulation  - ID: will continue ertapenem  IVPB 1000 milliGRAM(s) IV Intermittent every 24 hours  - MSK: will continue baclofen 10 milliGRAM(s) Oral every 8 hours  - NEURO: will continue carbidopa/levodopa  25/250 1 Tablet(s) Oral four times a day  - PSYCH: will continue mirtazapine 30 milliGRAM(s) Oral at bedtime  - PSYCH: will continue sertraline 150 milliGRAM(s) Oral daily  - SUPPLEMENT: will continue ergocalciferol 21935 Unit(s) Oral every week  - VTE PPX: will continue enoxaparin Injectable 40 milliGRAM(s) SubCutaneous every 24 hours

## 2023-08-21 NOTE — PHYSICAL THERAPY INITIAL EVALUATION ADULT - LIVES WITH, PROFILE
Pt lives with her  in a private home, reports has a walker at home but not using PTA. Pt's  reports pt had a fall 2 days before coming to the hospital where she may have hit her head. Pt walks with handheld assist from her  at all times.

## 2023-08-21 NOTE — PHYSICAL THERAPY INITIAL EVALUATION ADULT - PERTINENT HX OF CURRENT PROBLEM, REHAB EVAL
As per EMR "73y  Female with h/o Parkinson's disease, anxiety/depression, HLD, bladder prolapse. Patient presents to ED with c/o Mallory catheter w/ bloody urine. Patient is a poor historian. Patient was recently admitted at [facility] from 8/14-8/17 for urinary retention. She was discharged w/ Mallory and Bactrim. Patient accidentally tugged on her Mallory and displaced it on the evening of 8/18 which was followed by blood-tinged urine. Patient became more confused on 8/19  so her son brought her to the ER. In the ER patient is afebrile, leukocytosis to 12.69k. CT A/P reporting No hydronephrosis or obstructing stone. Bladder prolapse with wall thickening suggesting cystitis. Patient was started on ertapenem. " As per EMR "73y  Female with h/o Parkinson's disease, anxiety/depression, HLD, bladder prolapse. Patient presents to ED with c/o Mallory catheter w/ bloody urine. Patient is a poor historian. Patient was recently admitted at [facility] from 8/14-8/17 for urinary retention. She was discharged w/ Mallory and Bactrim. Patient accidentally tugged on her Mallory and displaced it on the evening of 8/18 which was followed by blood-tinged urine. Patient became more confused on 8/19  so her son brought her to the ER. In the ER patient is afebrile, leukocytosis to 12.69k. CT A/P reporting No hydronephrosis or obstructing stone. Bladder prolapse with wall thickening suggesting cystitis. Patient was started on ertapenem. " As per EMR "73y  Female with h/o Parkinson's disease, anxiety/depression, HLD, bladder prolapse. Patient presents to ED with c/o Mallory catheter w/ bloody urine. Patient is a poor historian. Patient was recently admitted at [facility] from 8/14-8/17 for urinary retention. She was discharged w/ Mallory and Bactrim. Patient accidentally tugged on her Mallory and displaced it on the evening of 8/18 which was followed by blood-tinged urine. Patient became more confused on 8/19  so her son brought her to the ER. In the ER patient is afebrile, leukocytosis to 12.69k. CT A/P reporting No hydronephrosis or obstructing stone. Bladder prolapse with wall thickening suggesting cystitis. Patient was started on ertapenem. "

## 2023-08-21 NOTE — PHYSICAL THERAPY INITIAL EVALUATION ADULT - NSPTDMEREC_GEN_A_CORE
D/C rec WAYNE; If pt to return home, pt will require RW and assist due to decrease strength and balance during transfers and ambulation. Per , pt has RW at home that she is not using./rolling walker

## 2023-08-21 NOTE — CARE COORDINATION ASSESSMENT. - READMITTED REASON YN
Pt was at [facility] 1 week ago due to concern and for catheter placement. Current hospitalization related. Currently on IV abx for 3d./Yes Pt was at [facility] 1 week ago due to concern and for catheter placement. Current hospitalization related. Currently on IV abx for 3d./Yes Pt was at [facility] 1 week ago due to concern and for catheter placement. Current hospitalization related. Currently on IV abx for 3d./Yes

## 2023-08-21 NOTE — PROGRESS NOTE ADULT - SUBJECTIVE AND OBJECTIVE BOX
Name: VELIA COELHO  MRN: [id number]  LOCATION: [facility] [place] 3WES 354 D1    ----  Patient is a 73y old  Female who presents with a chief complaint of UTI, sepsis (21 Aug 2023 14:13)      FROM ADMISSION H+P:   HPI:  74yo F w/ PMHx Parkinson's disease, anxiety/depression, HLD, bladder presents to ED c/o Mallory catheter w/ bloody urine. Per discussion w/ son at bedside, pt recently admitted at [facility] from 8/14-8/17 for urinary retention. She was discharged w/ Mallory and Bactrim. Pt's son reports that pt accidentally tugged on her Mallory and displaced it on the evening of 8/18. Subsequently, pt's son reports that pt was producing blood-tinged urine. On 8/19 AM, pt's son reports that pt seemed more confused, unable to accurately recall where she was, thus prompting him to bring pt to hospital. Pt denies CP, SOB, N/V/D/C, abdominal pain, HA, dizziness, cough.     ----  INTERVAL HPI/OVERNIGHT EVENTS:   Pt speaks Syriac only. [employer]  services were utilized for my interaction with the patient.  # [phone] Francisco  Pt seen and evaluated at the bedside. No acute overnight events occurred. The pt reports feeling overall better. Eager for dc planning. The pt has no dysuria. Pt has no suprapubic pain. No fever/chills. No changes in appetite. The pt reports no further complaints.     ----  PAST MEDICAL & SURGICAL HISTORY:  Parkinson's disease      Anxiety and depression      History of prolapse of bladder      Hyperlipidemia      No significant past surgical history          FAMILY HISTORY:  FH: CAD (coronary artery disease) (Father)    FH: HTN (hypertension) (Father)        Allergies    penicillin (Unknown)    Intolerances        ----  ANTIMICROBIALS:  ertapenem  IVPB      ertapenem  IVPB 1000 milliGRAM(s) IV Intermittent every 24 hours    CARDIOVASCULAR:    GASTROINTESTINAL:    PULMONARY:      ----  REVIEW OF SYSTEMS:  CONSTITUTIONAL: denies fever, chills   HEENT: denies blurred vision, sore throat  CARDIOVASCULAR: denies chest pain, chest pressure   RESPIRATORY: denies shortness of breath, sputum production  GASTROINTESTINAL: denies nausea, vomiting - reports good appetite  NEUROLOGICAL: denies numbness, headache, focal weakness  MUSCULOSKELETAL: denies new joint pain, muscle aches    ----  PHYSICAL EXAM:  GENERAL: patient appears well, no acute distress  ENMT: oropharynx clear without erythema, moist mucous membranes  LUNGS: good air entry bilaterally, clear to auscultation, symmetric breath sounds, no wheezing or rhonchi appreciated  HEART: soft S1/S2, regular rate and rhythm, no murmurs noted, no noted edema to b/l LE  GASTROINTESTINAL: abdomen is soft, nontender, nondistended, hypoactive bowel sounds, no palpable masses  MUSCULOSKELETAL: no clubbing or cyanosis, no obvious deformity  NEUROLOGIC: awake, alert, readily interactive, good muscle tone in 4 extremities    T(C): 36.3 (08-21-23 @ 12:35), Max: 37.3 (08-20-23 @ 21:11)  HR: 85 (08-21-23 @ 12:35) (82 - 92)  BP: 114/68 (08-21-23 @ 12:35) (114/68 - 145/68)  RR: 16 (08-21-23 @ 12:35) (16 - 17)  SpO2: 94% (08-21-23 @ 12:35) (93% - 96%)  Wt(kg): --    ----  INTAKE & OUTPUT:  I&O's Summary    20 Aug 2023 07:01  -  21 Aug 2023 07:00  --------------------------------------------------------  IN: 0 mL / OUT: 1100 mL / NET: -1100 mL    21 Aug 2023 07:01  -  21 Aug 2023 16:47  --------------------------------------------------------  IN: 0 mL / OUT: 250 mL / NET: -250 mL        LABS:                        10.7   7.95  )-----------( 281      ( 21 Aug 2023 07:14 )             32.9     08-21    143  |  110<H>  |  15  ----------------------------<  92  3.6   |  24  |  0.52    Ca    8.1<L>      21 Aug 2023 07:14    TPro  6.7  /  Alb  2.8<L>  /  TBili  0.5  /  DBili  x   /  AST  59<H>  /  ALT  7<L>  /  AlkPhos  73  08-21    PT/INR - ( 19 Aug 2023 22:05 )   PT: 14.4 sec;   INR: 1.24 ratio         PTT - ( 19 Aug 2023 22:05 )  PTT:29.1 sec  Urinalysis Basic - ( 21 Aug 2023 07:14 )    Color: x / Appearance: x / SG: x / pH: x  Gluc: 92 mg/dL / Ketone: x  / Bili: x / Urobili: x   Blood: x / Protein: x / Nitrite: x   Leuk Esterase: x / RBC: x / WBC x   Sq Epi: x / Non Sq Epi: x / Bacteria: x      CAPILLARY BLOOD GLUCOSE            Culture - Urine (collected 08-20-23 @ 00:20)  Source: Catheterized Catheterized  Final Report (08-21-23 @ 11:13):    <10,000 CFU/mL Normal Urogenital Betty    Culture - Blood (collected 08-19-23 @ 22:15)  Source: .Blood Blood  Preliminary Report (08-21-23 @ 07:01):    No growth at 24 hours    Culture - Blood (collected 08-19-23 @ 22:05)  Source: .Blood Blood  Preliminary Report (08-21-23 @ 07:01):    No growth at 24 hours        ----  Personally reviewed:  Vital sign trends: [ x ] yes    [  ] no     [  ] n/a  Laboratory results: [ x ] yes    [  ] no     [  ] n/a  Radiology results: [ x ] yes    [  ] no     [  ] n/a  Microbio results: [ x ] yes    [  ] no     [  ] n/a  Consultant recommendations: [ x ] yes    [  ] no     [  ] n/a - spoke w/ ID         Name: VELIA COELHO  MRN: [id number]  LOCATION: [facility] 3WES 354 D1    ----  Patient is a 73y old  Female who presents with a chief complaint of UTI, sepsis (21 Aug 2023 14:13)      FROM ADMISSION H+P:   HPI:  74yo F w/ PMHx Parkinson's disease, anxiety/depression, HLD, bladder presents to ED c/o Mallory catheter w/ bloody urine. Per discussion w/ son at bedside, pt recently admitted at [facility] from 8/14-8/17 for urinary retention. She was discharged w/ Mallory and Bactrim. Pt's son reports that pt accidentally tugged on her Mallory and displaced it on the evening of 8/18. Subsequently, pt's son reports that pt was producing blood-tinged urine. On 8/19 AM, pt's son reports that pt seemed more confused, unable to accurately recall where she was, thus prompting him to bring pt to hospital. Pt denies CP, SOB, N/V/D/C, abdominal pain, HA, dizziness, cough.     ----  INTERVAL HPI/OVERNIGHT EVENTS:   Pt speaks Syriac only. [employer]  services were utilized for my interaction with the patient.  # [phone] Francisco  Pt seen and evaluated at the bedside. No acute overnight events occurred. The pt reports feeling overall better. Eager for dc planning. The pt has no dysuria. Pt has no suprapubic pain. No fever/chills. No changes in appetite. The pt reports no further complaints.     ----  PAST MEDICAL & SURGICAL HISTORY:  Parkinson's disease      Anxiety and depression      History of prolapse of bladder      Hyperlipidemia      No significant past surgical history          FAMILY HISTORY:  FH: CAD (coronary artery disease) (Father)    FH: HTN (hypertension) (Father)        Allergies    penicillin (Unknown)    Intolerances        ----  ANTIMICROBIALS:  ertapenem  IVPB      ertapenem  IVPB 1000 milliGRAM(s) IV Intermittent every 24 hours    CARDIOVASCULAR:    GASTROINTESTINAL:    PULMONARY:      ----  REVIEW OF SYSTEMS:  CONSTITUTIONAL: denies fever, chills   HEENT: denies blurred vision, sore throat  CARDIOVASCULAR: denies chest pain, chest pressure   RESPIRATORY: denies shortness of breath, sputum production  GASTROINTESTINAL: denies nausea, vomiting - reports good appetite  NEUROLOGICAL: denies numbness, headache, focal weakness  MUSCULOSKELETAL: denies new joint pain, muscle aches    ----  PHYSICAL EXAM:  GENERAL: patient appears well, no acute distress  ENMT: oropharynx clear without erythema, moist mucous membranes  LUNGS: good air entry bilaterally, clear to auscultation, symmetric breath sounds, no wheezing or rhonchi appreciated  HEART: soft S1/S2, regular rate and rhythm, no murmurs noted, no noted edema to b/l LE  GASTROINTESTINAL: abdomen is soft, nontender, nondistended, hypoactive bowel sounds, no palpable masses  MUSCULOSKELETAL: no clubbing or cyanosis, no obvious deformity  NEUROLOGIC: awake, alert, readily interactive, good muscle tone in 4 extremities    T(C): 36.3 (08-21-23 @ 12:35), Max: 37.3 (08-20-23 @ 21:11)  HR: 85 (08-21-23 @ 12:35) (82 - 92)  BP: 114/68 (08-21-23 @ 12:35) (114/68 - 145/68)  RR: 16 (08-21-23 @ 12:35) (16 - 17)  SpO2: 94% (08-21-23 @ 12:35) (93% - 96%)  Wt(kg): --    ----  INTAKE & OUTPUT:  I&O's Summary    20 Aug 2023 07:01  -  21 Aug 2023 07:00  --------------------------------------------------------  IN: 0 mL / OUT: 1100 mL / NET: -1100 mL    21 Aug 2023 07:01  -  21 Aug 2023 16:47  --------------------------------------------------------  IN: 0 mL / OUT: 250 mL / NET: -250 mL        LABS:                        10.7   7.95  )-----------( 281      ( 21 Aug 2023 07:14 )             32.9     08-21    143  |  110<H>  |  15  ----------------------------<  92  3.6   |  24  |  0.52    Ca    8.1<L>      21 Aug 2023 07:14    TPro  6.7  /  Alb  2.8<L>  /  TBili  0.5  /  DBili  x   /  AST  59<H>  /  ALT  7<L>  /  AlkPhos  73  08-21    PT/INR - ( 19 Aug 2023 22:05 )   PT: 14.4 sec;   INR: 1.24 ratio         PTT - ( 19 Aug 2023 22:05 )  PTT:29.1 sec  Urinalysis Basic - ( 21 Aug 2023 07:14 )    Color: x / Appearance: x / SG: x / pH: x  Gluc: 92 mg/dL / Ketone: x  / Bili: x / Urobili: x   Blood: x / Protein: x / Nitrite: x   Leuk Esterase: x / RBC: x / WBC x   Sq Epi: x / Non Sq Epi: x / Bacteria: x      CAPILLARY BLOOD GLUCOSE            Culture - Urine (collected 08-20-23 @ 00:20)  Source: Catheterized Catheterized  Final Report (08-21-23 @ 11:13):    <10,000 CFU/mL Normal Urogenital Betty    Culture - Blood (collected 08-19-23 @ 22:15)  Source: .Blood Blood  Preliminary Report (08-21-23 @ 07:01):    No growth at 24 hours    Culture - Blood (collected 08-19-23 @ 22:05)  Source: .Blood Blood  Preliminary Report (08-21-23 @ 07:01):    No growth at 24 hours        ----  Personally reviewed:  Vital sign trends: [ x ] yes    [  ] no     [  ] n/a  Laboratory results: [ x ] yes    [  ] no     [  ] n/a  Radiology results: [ x ] yes    [  ] no     [  ] n/a  Microbio results: [ x ] yes    [  ] no     [  ] n/a  Consultant recommendations: [ x ] yes    [  ] no     [  ] n/a - spoke w/ ID         Name: VELIA COELHO  MRN: [id number]  LOCATION: \[facility] [place] [facility]\"" 3WES 354 D1    ----  Patient is a 73y old  Female who presents with a chief complaint of UTI, sepsis (21 Aug 2023 14:13)      FROM ADMISSION H+P:   HPI:  72yo F w/ PMHx Parkinson's disease, anxiety/depression, HLD, bladder presents to ED c/o Mallory catheter w/ bloody urine. Per discussion w/ son at bedside, pt recently admitted at [facility] from 8/14-8/17 for urinary retention. She was discharged w/ Mallory and Bactrim. Pt's son reports that pt accidentally tugged on her Mallory and displaced it on the evening of 8/18. Subsequently, pt's son reports that pt was producing blood-tinged urine. On 8/19 AM, pt's son reports that pt seemed more confused, unable to accurately recall where she was, thus prompting him to bring pt to hospital. Pt denies CP, SOB, N/V/D/C, abdominal pain, HA, dizziness, cough.     ----  INTERVAL HPI/OVERNIGHT EVENTS:   Pt speaks Arabic only. [employer]  services were utilized for my interaction with the patient.  # [phone] Francisco  Pt seen and evaluated at the bedside. No acute overnight events occurred. The pt reports feeling overall better. Eager for dc planning. The pt has no dysuria. Pt has no suprapubic pain. No fever/chills. No changes in appetite. The pt reports no further complaints.     ----  PAST MEDICAL & SURGICAL HISTORY:  Parkinson's disease      Anxiety and depression      History of prolapse of bladder      Hyperlipidemia      No significant past surgical history          FAMILY HISTORY:  FH: CAD (coronary artery disease) (Father)    FH: HTN (hypertension) (Father)        Allergies    penicillin (Unknown)    Intolerances        ----  ANTIMICROBIALS:  ertapenem  IVPB      ertapenem  IVPB 1000 milliGRAM(s) IV Intermittent every 24 hours    CARDIOVASCULAR:    GASTROINTESTINAL:    PULMONARY:      ----  REVIEW OF SYSTEMS:  CONSTITUTIONAL: denies fever, chills   HEENT: denies blurred vision, sore throat  CARDIOVASCULAR: denies chest pain, chest pressure   RESPIRATORY: denies shortness of breath, sputum production  GASTROINTESTINAL: denies nausea, vomiting - reports good appetite  NEUROLOGICAL: denies numbness, headache, focal weakness  MUSCULOSKELETAL: denies new joint pain, muscle aches    ----  PHYSICAL EXAM:  GENERAL: patient appears well, no acute distress  ENMT: oropharynx clear without erythema, moist mucous membranes  LUNGS: good air entry bilaterally, clear to auscultation, symmetric breath sounds, no wheezing or rhonchi appreciated  HEART: soft S1/S2, regular rate and rhythm, no murmurs noted, no noted edema to b/l LE  GASTROINTESTINAL: abdomen is soft, nontender, nondistended, hypoactive bowel sounds, no palpable masses  MUSCULOSKELETAL: no clubbing or cyanosis, no obvious deformity  NEUROLOGIC: awake, alert, readily interactive, good muscle tone in 4 extremities    T(C): 36.3 (08-21-23 @ 12:35), Max: 37.3 (08-20-23 @ 21:11)  HR: 85 (08-21-23 @ 12:35) (82 - 92)  BP: 114/68 (08-21-23 @ 12:35) (114/68 - 145/68)  RR: 16 (08-21-23 @ 12:35) (16 - 17)  SpO2: 94% (08-21-23 @ 12:35) (93% - 96%)  Wt(kg): --    ----  INTAKE & OUTPUT:  I&O's Summary    20 Aug 2023 07:01  -  21 Aug 2023 07:00  --------------------------------------------------------  IN: 0 mL / OUT: 1100 mL / NET: -1100 mL    21 Aug 2023 07:01  -  21 Aug 2023 16:47  --------------------------------------------------------  IN: 0 mL / OUT: 250 mL / NET: -250 mL        LABS:                        10.7   7.95  )-----------( 281      ( 21 Aug 2023 07:14 )             32.9     08-21    143  |  110<H>  |  15  ----------------------------<  92  3.6   |  24  |  0.52    Ca    8.1<L>      21 Aug 2023 07:14    TPro  6.7  /  Alb  2.8<L>  /  TBili  0.5  /  DBili  x   /  AST  59<H>  /  ALT  7<L>  /  AlkPhos  73  08-21    PT/INR - ( 19 Aug 2023 22:05 )   PT: 14.4 sec;   INR: 1.24 ratio         PTT - ( 19 Aug 2023 22:05 )  PTT:29.1 sec  Urinalysis Basic - ( 21 Aug 2023 07:14 )    Color: x / Appearance: x / SG: x / pH: x  Gluc: 92 mg/dL / Ketone: x  / Bili: x / Urobili: x   Blood: x / Protein: x / Nitrite: x   Leuk Esterase: x / RBC: x / WBC x   Sq Epi: x / Non Sq Epi: x / Bacteria: x      CAPILLARY BLOOD GLUCOSE            Culture - Urine (collected 08-20-23 @ 00:20)  Source: Catheterized Catheterized  Final Report (08-21-23 @ 11:13):    <10,000 CFU/mL Normal Urogenital Betty    Culture - Blood (collected 08-19-23 @ 22:15)  Source: .Blood Blood  Preliminary Report (08-21-23 @ 07:01):    No growth at 24 hours    Culture - Blood (collected 08-19-23 @ 22:05)  Source: .Blood Blood  Preliminary Report (08-21-23 @ 07:01):    No growth at 24 hours        ----  Personally reviewed:  Vital sign trends: [ x ] yes    [  ] no     [  ] n/a  Laboratory results: [ x ] yes    [  ] no     [  ] n/a  Radiology results: [ x ] yes    [  ] no     [  ] n/a  Microbio results: [ x ] yes    [  ] no     [  ] n/a  Consultant recommendations: [ x ] yes    [  ] no     [  ] n/a - spoke w/ ID

## 2023-08-21 NOTE — PROGRESS NOTE ADULT - PROBLEM SELECTOR PLAN 1
sepsis 2/2 acute cystitis, POA  - CT AP: No hydronephrosis or obstructing stone. Bladder prolapse with wall thickening suggesting cystitis. Correlate with urinalysis.  - urine culture here is neg, blood cultures neg  - IV abx as above - spoke w/ ID - plan for 3d of abx - then dc tomorrow

## 2023-08-21 NOTE — PROGRESS NOTE ADULT - SUBJECTIVE AND OBJECTIVE BOX
[facility]   INFECTIOUS DISEASES   [address] [place] [address]  Tel: [phone]     Fax: [phone]  =========================================================  MD Js Joseph Kaushal, MD Cho, Michelle, MD Sunjit, Jaspal, MD  =========================================================    VELIA COELHO [id number]    Follow up: Hematuria     Sitting on chair, son at the bedside, no fever, hematuria is improving.   No new complaint, no abdominal or flank pain.     Allergies:  penicillin (Unknown)    Antibiotics:  atorvastatin 10 milliGRAM(s) Oral at bedtime  baclofen 10 milliGRAM(s) Oral every 8 hours  carbidopa/levodopa  25/250 1 Tablet(s) Oral four times a day  enoxaparin Injectable 40 milliGRAM(s) SubCutaneous every 24 hours  ergocalciferol 78029 Unit(s) Oral every week  ertapenem  IVPB      ertapenem  IVPB 1000 milliGRAM(s) IV Intermittent every 24 hours  megestrol Suspension 400 milliGRAM(s) Oral daily  mirtazapine 30 milliGRAM(s) Oral at bedtime  sertraline 150 milliGRAM(s) Oral daily     REVIEW OF SYSTEMS:  CONSTITUTIONAL:  No Fever or chills  HEENT:   No diplopia or blurred vision.  No earache, sore throat or runny nose.  CARDIOVASCULAR:  No chest pain or SOB  RESPIRATORY:  No cough, shortness of breath, PND or orthopnea.  GASTROINTESTINAL:  No nausea, vomiting or diarrhea.  GENITOURINARY:  No dysuria, frequency or urgency. No Blood in urine  MUSCULOSKELETAL:  no joint aches, no muscle pain  SKIN:  No change in skin, hair or nails.  NEUROLOGIC:  No paresthesias or weakness.  PSYCHIATRIC:  No disorder of thought or mood.  ENDOCRINE:  No heat or cold intolerance, polyuria or polydipsia.  HEMATOLOGICAL:  No easy bruising or bleeding.      Physical Exam:  ICU Vital Signs Last 24 Hrs  T(C): 36.3 (21 Aug 2023 12:35), Max: 37.3 (20 Aug 2023 21:11)  T(F): 97.4 (21 Aug 2023 12:35), Max: 99.2 (20 Aug 2023 21:11)  HR: 85 (21 Aug 2023 12:35) (82 - 92)  BP: 114/68 (21 Aug 2023 12:35) (114/68 - 145/68)  BP(mean): --  ABP: --  ABP(mean): --  RR: 16 (21 Aug 2023 12:35) (16 - 17)  SpO2: 94% (21 Aug 2023 12:35) (93% - 96%)  O2 Parameters below as of 21 Aug 2023 12:35  Patient On (Oxygen Delivery Method): room air  GEN: NAD  HEENT: normocephalic and atraumatic. EOMI. MIGUELANGEL.    NECK: Supple.  No lymphadenopathy   LUNGS: Clear to auscultation.  HEART: Regular rate and rhythm   ABDOMEN: Soft, nontender, and nondistended.  Positive bowel sounds.    : No CVA tenderness, Mallory in place   EXTREMITIES: Without edema.  MSK: no joint swelling  NEUROLOGIC: grossly intact.  PSYCHIATRIC: Appropriate affect .  SKIN: No rash     Labs:  08-21    143  |  110<H>  |  15  ----------------------------<  92  3.6   |  24  |  0.52    Ca    8.1<L>      21 Aug 2023 07:14    TPro  6.7  /  Alb  2.8<L>  /  TBili  0.5  /  DBili  x   /  AST  59<H>  /  ALT  7<L>  /  AlkPhos  73  08-21                        10.7   7.95  )-----------( 281      ( 21 Aug 2023 07:14 )             32.9     PT/INR - ( 19 Aug 2023 22:05 )   PT: 14.4 sec;   INR: 1.24 ratio    PTT - ( 19 Aug 2023 22:05 )  PTT:29.1 sec  Urinalysis Basic - ( 21 Aug 2023 07:14 )    Color: x / Appearance: x / SG: x / pH: x  Gluc: 92 mg/dL / Ketone: x  / Bili: x / Urobili: x   Blood: x / Protein: x / Nitrite: x   Leuk Esterase: x / RBC: x / WBC x   Sq Epi: x / Non Sq Epi: x / Bacteria: x    LIVER FUNCTIONS - ( 21 Aug 2023 07:14 )  Alb: 2.8 g/dL / Pro: 6.7 g/dL / ALK PHOS: 73 U/L / ALT: 7 U/L / AST: 59 U/L / GGT: x           RECENT CULTURES:  08-20 @ 00:20 Catheterized Catheterized     <10,000 CFU/mL Normal Urogenital Betty    08-19 @ 22:15 .Blood Blood     No growth at 24 hours    08-19 @ 22:05 .Blood Blood     No growth at 24 hours      All imaging and data are reviewed.   < from: CT Abdomen and Pelvis No Cont (08.19.23 @ 22:37) >  IMPRESSION:  No hydronephrosis or obstructing stone.  Bladder prolapse with wall thickening suggesting cystitis. Correlate with   urinalysis.      Assessment and Plan:   73y  Female with h/o Parkinson's disease, anxiety/depression, HLD, bladder prolapse. Patient presents to ED with c/o Mallory catheter w/ bloody urine. Patient is a poor historian. Patient was recently admitted at [facility] from 8/14-8/17 for urinary retention. She was discharged w/ Mallory and Bactrim. Patient accidentally tugged on her Mallory and displaced it on the evening of 8/18 which was followed by blood-tinged urine. Patient became more confused on 8/19  so her son brought her to the ER. In the ER patient is afebrile, leukocytosis to 12.69k. CT A/P reporting No hydronephrosis or obstructing stone. Bladder prolapse with wall thickening suggesting cystitis. Patient was started on ertapenem.     Hematuria   Leukocytosis  Urinary retention s/p Mallory   PCN allergy     - Blood cultures NGTD   - RVP/COVID 19 PCR 8/19 negative   - Urine culture negative   - CT A/P reporting bladder prolapse  - UA not very strongly suggestive of UTI   - Continue Ertapenem to complete 3 days, tomorrow last day.     Will sign off please call with any question.   Discussed with family at the side and Dr. Vásquez.     Nayeli Noyola MD  Division of Infectious Diseases   Please call ID service at [phone] with any question.      35 minutes spent on total encounter assessing patient, examination, chart review, counseling and coordinating care by the attending physician/nurse/care manager.   [facility]   INFECTIOUS DISEASES   [address] [place] [address]  Tel: [phone]     Fax: [phone]  =========================================================  MD Js Joseph Kaushal, MD Cho, Michelle, MD Sunjit, Jaspal, MD  =========================================================    VELIA COELHO [id number]    Follow up: Hematuria     Sitting on chair, son at the bedside, no fever, hematuria is improving.   No new complaint, no abdominal or flank pain.     Allergies:  penicillin (Unknown)    Antibiotics:  atorvastatin 10 milliGRAM(s) Oral at bedtime  baclofen 10 milliGRAM(s) Oral every 8 hours  carbidopa/levodopa  25/250 1 Tablet(s) Oral four times a day  enoxaparin Injectable 40 milliGRAM(s) SubCutaneous every 24 hours  ergocalciferol 38287 Unit(s) Oral every week  ertapenem  IVPB      ertapenem  IVPB 1000 milliGRAM(s) IV Intermittent every 24 hours  megestrol Suspension 400 milliGRAM(s) Oral daily  mirtazapine 30 milliGRAM(s) Oral at bedtime  sertraline 150 milliGRAM(s) Oral daily     REVIEW OF SYSTEMS:  CONSTITUTIONAL:  No Fever or chills  HEENT:   No diplopia or blurred vision.  No earache, sore throat or runny nose.  CARDIOVASCULAR:  No chest pain or SOB  RESPIRATORY:  No cough, shortness of breath, PND or orthopnea.  GASTROINTESTINAL:  No nausea, vomiting or diarrhea.  GENITOURINARY:  No dysuria, frequency or urgency. No Blood in urine  MUSCULOSKELETAL:  no joint aches, no muscle pain  SKIN:  No change in skin, hair or nails.  NEUROLOGIC:  No paresthesias or weakness.  PSYCHIATRIC:  No disorder of thought or mood.  ENDOCRINE:  No heat or cold intolerance, polyuria or polydipsia.  HEMATOLOGICAL:  No easy bruising or bleeding.      Physical Exam:  ICU Vital Signs Last 24 Hrs  T(C): 36.3 (21 Aug 2023 12:35), Max: 37.3 (20 Aug 2023 21:11)  T(F): 97.4 (21 Aug 2023 12:35), Max: 99.2 (20 Aug 2023 21:11)  HR: 85 (21 Aug 2023 12:35) (82 - 92)  BP: 114/68 (21 Aug 2023 12:35) (114/68 - 145/68)  BP(mean): --  ABP: --  ABP(mean): --  RR: 16 (21 Aug 2023 12:35) (16 - 17)  SpO2: 94% (21 Aug 2023 12:35) (93% - 96%)  O2 Parameters below as of 21 Aug 2023 12:35  Patient On (Oxygen Delivery Method): room air  GEN: NAD  HEENT: normocephalic and atraumatic. EOMI. MIGUELANGEL.    NECK: Supple.  No lymphadenopathy   LUNGS: Clear to auscultation.  HEART: Regular rate and rhythm   ABDOMEN: Soft, nontender, and nondistended.  Positive bowel sounds.    : No CVA tenderness, Mallory in place   EXTREMITIES: Without edema.  MSK: no joint swelling  NEUROLOGIC: grossly intact.  PSYCHIATRIC: Appropriate affect .  SKIN: No rash     Labs:  08-21    143  |  110<H>  |  15  ----------------------------<  92  3.6   |  24  |  0.52    Ca    8.1<L>      21 Aug 2023 07:14    TPro  6.7  /  Alb  2.8<L>  /  TBili  0.5  /  DBili  x   /  AST  59<H>  /  ALT  7<L>  /  AlkPhos  73  08-21                        10.7   7.95  )-----------( 281      ( 21 Aug 2023 07:14 )             32.9     PT/INR - ( 19 Aug 2023 22:05 )   PT: 14.4 sec;   INR: 1.24 ratio    PTT - ( 19 Aug 2023 22:05 )  PTT:29.1 sec  Urinalysis Basic - ( 21 Aug 2023 07:14 )    Color: x / Appearance: x / SG: x / pH: x  Gluc: 92 mg/dL / Ketone: x  / Bili: x / Urobili: x   Blood: x / Protein: x / Nitrite: x   Leuk Esterase: x / RBC: x / WBC x   Sq Epi: x / Non Sq Epi: x / Bacteria: x    LIVER FUNCTIONS - ( 21 Aug 2023 07:14 )  Alb: 2.8 g/dL / Pro: 6.7 g/dL / ALK PHOS: 73 U/L / ALT: 7 U/L / AST: 59 U/L / GGT: x           RECENT CULTURES:  08-20 @ 00:20 Catheterized Catheterized     <10,000 CFU/mL Normal Urogenital Betty    08-19 @ 22:15 .Blood Blood     No growth at 24 hours    08-19 @ 22:05 .Blood Blood     No growth at 24 hours      All imaging and data are reviewed.   < from: CT Abdomen and Pelvis No Cont (08.19.23 @ 22:37) >  IMPRESSION:  No hydronephrosis or obstructing stone.  Bladder prolapse with wall thickening suggesting cystitis. Correlate with   urinalysis.      Assessment and Plan:   73y  Female with h/o Parkinson's disease, anxiety/depression, HLD, bladder prolapse. Patient presents to ED with c/o Mallory catheter w/ bloody urine. Patient is a poor historian. Patient was recently admitted at [facility] from 8/14-8/17 for urinary retention. She was discharged w/ Mallory and Bactrim. Patient accidentally tugged on her Mallory and displaced it on the evening of 8/18 which was followed by blood-tinged urine. Patient became more confused on 8/19  so her son brought her to the ER. In the ER patient is afebrile, leukocytosis to 12.69k. CT A/P reporting No hydronephrosis or obstructing stone. Bladder prolapse with wall thickening suggesting cystitis. Patient was started on ertapenem.     Hematuria   Leukocytosis  Urinary retention s/p Mallory   PCN allergy     - Blood cultures NGTD   - RVP/COVID 19 PCR 8/19 negative   - Urine culture negative   - CT A/P reporting bladder prolapse  - UA not very strongly suggestive of UTI   - Continue Ertapenem to complete 3 days, tomorrow last day.     Will sign off please call with any question.   Discussed with family at the side and Dr. Vásquez.     Nayeli Noyola MD  Division of Infectious Diseases   Please call ID service at [phone] with any question.      35 minutes spent on total encounter assessing patient, examination, chart review, counseling and coordinating care by the attending physician/nurse/care manager.   [facility]   INFECTIOUS DISEASES   [address] [place] [address]  Tel: [phone]     Fax: [phone]  =========================================================  MD sJ Joseph Kaushal, MD Cho, Michelle, MD Sunjit, Jaspal, MD  =========================================================    EVLIA COELHO [id number]    Follow up: Hematuria     Sitting on chair, son at the bedside, no fever, hematuria is improving.   No new complaint, no abdominal or flank pain.     Allergies:  penicillin (Unknown)    Antibiotics:  atorvastatin 10 milliGRAM(s) Oral at bedtime  baclofen 10 milliGRAM(s) Oral every 8 hours  carbidopa/levodopa  25/250 1 Tablet(s) Oral four times a day  enoxaparin Injectable 40 milliGRAM(s) SubCutaneous every 24 hours  ergocalciferol 11024 Unit(s) Oral every week  ertapenem  IVPB      ertapenem  IVPB 1000 milliGRAM(s) IV Intermittent every 24 hours  megestrol Suspension 400 milliGRAM(s) Oral daily  mirtazapine 30 milliGRAM(s) Oral at bedtime  sertraline 150 milliGRAM(s) Oral daily     REVIEW OF SYSTEMS:  CONSTITUTIONAL:  No Fever or chills  HEENT:   No diplopia or blurred vision.  No earache, sore throat or runny nose.  CARDIOVASCULAR:  No chest pain or SOB  RESPIRATORY:  No cough, shortness of breath, PND or orthopnea.  GASTROINTESTINAL:  No nausea, vomiting or diarrhea.  GENITOURINARY:  No dysuria, frequency or urgency. No Blood in urine  MUSCULOSKELETAL:  no joint aches, no muscle pain  SKIN:  No change in skin, hair or nails.  NEUROLOGIC:  No paresthesias or weakness.  PSYCHIATRIC:  No disorder of thought or mood.  ENDOCRINE:  No heat or cold intolerance, polyuria or polydipsia.  HEMATOLOGICAL:  No easy bruising or bleeding.      Physical Exam:  ICU Vital Signs Last 24 Hrs  T(C): 36.3 (21 Aug 2023 12:35), Max: 37.3 (20 Aug 2023 21:11)  T(F): 97.4 (21 Aug 2023 12:35), Max: 99.2 (20 Aug 2023 21:11)  HR: 85 (21 Aug 2023 12:35) (82 - 92)  BP: 114/68 (21 Aug 2023 12:35) (114/68 - 145/68)  BP(mean): --  ABP: --  ABP(mean): --  RR: 16 (21 Aug 2023 12:35) (16 - 17)  SpO2: 94% (21 Aug 2023 12:35) (93% - 96%)  O2 Parameters below as of 21 Aug 2023 12:35  Patient On (Oxygen Delivery Method): room air  GEN: NAD  HEENT: normocephalic and atraumatic. EOMI. MIGUELANGEL.    NECK: Supple.  No lymphadenopathy   LUNGS: Clear to auscultation.  HEART: Regular rate and rhythm   ABDOMEN: Soft, nontender, and nondistended.  Positive bowel sounds.    : No CVA tenderness, Mallory in place   EXTREMITIES: Without edema.  MSK: no joint swelling  NEUROLOGIC: grossly intact.  PSYCHIATRIC: Appropriate affect .  SKIN: No rash     Labs:  08-21    143  |  110<H>  |  15  ----------------------------<  92  3.6   |  24  |  0.52    Ca    8.1<L>      21 Aug 2023 07:14    TPro  6.7  /  Alb  2.8<L>  /  TBili  0.5  /  DBili  x   /  AST  59<H>  /  ALT  7<L>  /  AlkPhos  73  08-21                        10.7   7.95  )-----------( 281      ( 21 Aug 2023 07:14 )             32.9     PT/INR - ( 19 Aug 2023 22:05 )   PT: 14.4 sec;   INR: 1.24 ratio    PTT - ( 19 Aug 2023 22:05 )  PTT:29.1 sec  Urinalysis Basic - ( 21 Aug 2023 07:14 )    Color: x / Appearance: x / SG: x / pH: x  Gluc: 92 mg/dL / Ketone: x  / Bili: x / Urobili: x   Blood: x / Protein: x / Nitrite: x   Leuk Esterase: x / RBC: x / WBC x   Sq Epi: x / Non Sq Epi: x / Bacteria: x    LIVER FUNCTIONS - ( 21 Aug 2023 07:14 )  Alb: 2.8 g/dL / Pro: 6.7 g/dL / ALK PHOS: 73 U/L / ALT: 7 U/L / AST: 59 U/L / GGT: x           RECENT CULTURES:  08-20 @ 00:20 Catheterized Catheterized     <10,000 CFU/mL Normal Urogenital Betty    08-19 @ 22:15 .Blood Blood     No growth at 24 hours    08-19 @ 22:05 .Blood Blood     No growth at 24 hours      All imaging and data are reviewed.   < from: CT Abdomen and Pelvis No Cont (08.19.23 @ 22:37) >  IMPRESSION:  No hydronephrosis or obstructing stone.  Bladder prolapse with wall thickening suggesting cystitis. Correlate with   urinalysis.      Assessment and Plan:   73y  Female with h/o Parkinson's disease, anxiety/depression, HLD, bladder prolapse. Patient presents to ED with c/o Mallory catheter w/ bloody urine. Patient is a poor historian. Patient was recently admitted at [facility] from 8/14-8/17 for urinary retention. She was discharged w/ Mallory and Bactrim. Patient accidentally tugged on her Mallory and displaced it on the evening of 8/18 which was followed by blood-tinged urine. Patient became more confused on 8/19  so her son brought her to the ER. In the ER patient is afebrile, leukocytosis to 12.69k. CT A/P reporting No hydronephrosis or obstructing stone. Bladder prolapse with wall thickening suggesting cystitis. Patient was started on ertapenem.     Hematuria   Leukocytosis  Urinary retention s/p Mallory   PCN allergy     - Blood cultures NGTD   - RVP/COVID 19 PCR 8/19 negative   - Urine culture negative   - CT A/P reporting bladder prolapse  - UA not very strongly suggestive of UTI   - Continue Ertapenem to complete 3 days, tomorrow last day.     Will sign off please call with any question.   Discussed with family at the side and Dr. Vásquez.     Nayeli Noyola MD  Division of Infectious Diseases   Please call ID service at [phone] with any question.      35 minutes spent on total encounter assessing patient, examination, chart review, counseling and coordinating care by the attending physician/nurse/care manager.

## 2023-08-21 NOTE — PROGRESS NOTE ADULT - PROBLEM SELECTOR PLAN 3
meds as above - supportive care
Chronic  - c/w home carbidopa-levodopa  - c/w home megestrol, Ensure supplements  - ambulate w/ assistance  - fall precautions

## 2023-08-21 NOTE — CARE COORDINATION ASSESSMENT. - NSPASTMEDSURGHISTORY_GEN_ALL_CORE_FT
PAST MEDICAL & SURGICAL HISTORY:  Hyperlipidemia      History of prolapse of bladder      Anxiety and depression      Parkinson's disease      No significant past surgical history

## 2023-08-21 NOTE — PHYSICAL THERAPY INITIAL EVALUATION ADULT - MANUAL MUSCLE TESTING RESULTS, REHAB EVAL
; B UE and B LE grossly < or = to 3+/5 via functional assessment, no resistance applied./grossly assessed due to

## 2023-08-21 NOTE — PHYSICAL THERAPY INITIAL EVALUATION ADULT - CRITERIA FOR SKILLED THERAPEUTIC INTERVENTIONS
Encounter Date: 11/22/2022       History     Chief Complaint   Patient presents with    Wound Check     States Dr. Ellis middle toe on R foot amputated. Told to come to ED. Denies pain.      56-year-old male with history of type 2 diabetes, CAD, hypertension, colon cancer, presents to the ER by referral from Podiatry Clinic due to right 3rd toe ulcer with infection.  Patient has been on 2 antibiotics for the last week.  He was seen by Dr. Melara in clinic today and was advised to come to the ER due to malodorous drainage and worsening appearance of right 3rd toe wound.  Patient denies associated pain.  He denies recent fever, vomiting, or additional complaints at this time.      Review of patient's allergies indicates:   Allergen Reactions    Penicillins Other (See Comments)     PCN allergy as a child - was told he went into a coma. Tolerates Cefazolin without adverse reactions    Penicillin     Shellfish containing products      Other reaction(s): Unknown    Vancomycin Itching     Tolerated vancomycin 7/2020    Bactrim  [sulfamethoxazole-trimethoprim] Rash     Past Medical History:   Diagnosis Date    Allergy     CAD (coronary artery disease), native coronary artery 6/25/2013    Cancer     COVID-19 virus detected 9/1/2020    Diabetes mellitus     Diabetes mellitus, type 2     Disorder of kidney and ureter     Heart attack 04/2012    Hx of colon cancer, stage I     Hyperlipidemia     Hypertension     Muscular pain     post-op after colonoscopy    NSTEMI May 2013 - peak troponin 0.22 5/22/2013    MICHAELA (obstructive sleep apnea)     Retinopathy due to secondary diabetes      Past Surgical History:   Procedure Laterality Date    COLONOSCOPY N/A 2/17/2017    Procedure: COLONOSCOPY;  Surgeon: Simon Gomez MD;  Location: [facility] ([address] [place] [address]);  Service: Endoscopy;  Laterality: N/A;    CORONARY ANGIOPLASTY      INCISION AND DRAINAGE FOOT Right 6/5/2018    Procedure: INCISION AND DRAINAGE, FOOT;  Surgeon: Bridget Santana,  DAE;  Location: [facility] OR 1ST FLR;  Service: Podiatry;  Laterality: Right;  Request mini C arm in room. request wound vac with medium black sponge    INCISION AND DRAINAGE FOOT Right 6/7/2018    Procedure: INCISION AND DRAINAGE, FOOT;  Surgeon: Emelia Brock DPM;  Location: [facility] OR 2ND FLR;  Service: Podiatry;  Laterality: Right;    INCISION AND DRAINAGE FOOT Left 9/4/2020    Procedure: INCISION AND DRAINAGE, FOOT;  Surgeon: Ainsley Powell DPM;  Location: [facility] OR 2ND FLR;  Service: Podiatry;  Laterality: Left;    INCISION AND DRAINAGE FOOT Left 12/22/2020    Procedure: INCISION AND DRAINAGE, FOOT;  Surgeon: Ainsley Powell DPM;  [facility];  Service: Podiatry;  Laterality: Left;  May need micro choice  Need Jam shidi needles  Stretcher OK      INCISION AND DRAINAGE OF ABSCESS Right 6/2/2018    Procedure: INCISION AND DRAINAGE, ABSCESS;  Surgeon: Bridget Santana DPM;  [facility];  Service: General;  Laterality: Right;    OSTEOTOMY OF METATARSAL BONE  9/4/2020    Procedure: OSTEOTOMY, METATARSAL BONE, 1st METATARSAL RESECTION;  Surgeon: Ainsley Powell DPM;  [facility];  Service: Podiatry;;    REMOVAL OF FOREIGN BODY FROM FOOT Right 6/7/2018    Procedure: REMOVAL, FOREIGN BODY, FOOT;  Surgeon: Emelia Brock DPM;  Location: [facility] OR 2ND FLR;  Service: Podiatry;  Laterality: Right;    TOE AMPUTATION Left 7/4/2020    Procedure: AMPUTATION, TOE;  Surgeon: Bridget Santana DPM;  [facility];  Service: Podiatry;  Laterality: Left;    TOE AMPUTATION Left 10/14/2020    Procedure: AMPUTATION, TOE;  Surgeon: Mingo Melara DPM;  Location: [facility] OR 2ND FLR;  Service: Podiatry;  Laterality: Left;  stretcher OK    TOE AMPUTATION Right 6/9/2022    Procedure: AMPUTATION, TOE - Dr. Melara @ [place] in am;  Surgeon: Mingo Melara DPM;  Location: [facility] OR 1ST FLR;  Service: Podiatry;  Laterality: Right;    TONSILLECTOMY       Family History   Problem Relation Age of  Onset    Heart disease Mother     Diabetes Mother     Diabetes Father     Heart disease Brother     Diabetes Maternal Grandmother     Diabetes Maternal Grandfather     Diabetes Paternal Grandmother     Diabetes Paternal Grandfather     Anesthesia problems Neg Hx      Social History     Tobacco Use    Smoking status: Never    Smokeless tobacco: Never   Substance Use Topics    Alcohol use: Yes     Comment: rare x1 beer-     Drug use: No     Review of Systems   Constitutional:  Negative for chills and fever.   HENT:  Negative for sore throat.    Respiratory:  Negative for shortness of breath.    Cardiovascular:  Negative for chest pain.   Gastrointestinal:  Negative for nausea and vomiting.   Genitourinary:  Negative for dysuria.   Musculoskeletal:  Negative for back pain.   Skin:  Positive for wound. Negative for rash.   Neurological:  Negative for dizziness, syncope, weakness and light-headedness.   Hematological:  Does not bruise/bleed easily.     Physical Exam     Initial Vitals [11/22/22 0910]   BP Pulse Resp Temp SpO2   112/65 62 16 97.9 °F (36.6 °C) 98 %      MAP       --         Physical Exam    Nursing note and vitals reviewed.  Constitutional: He appears well-developed and well-nourished.   HENT:   Head: Atraumatic.   Eyes: Conjunctivae and EOM are normal. Pupils are equal, round, and reactive to light.   Neck: Neck supple.   Normal range of motion.  Cardiovascular:  Normal rate and regular rhythm.           Pulmonary/Chest: Breath sounds normal. No respiratory distress. He has no wheezes. He has no rhonchi. He has no rales.   Abdominal: Abdomen is soft. Bowel sounds are normal. There is no abdominal tenderness.   Musculoskeletal:      Cervical back: Normal range of motion and neck supple.     Neurological: He is alert and oriented to person, place, and time.   Skin: No rash noted.   Psychiatric: He has a normal mood and affect.                   ED Course   Procedures  Labs Reviewed   CBC W/ AUTO  DIFFERENTIAL - Abnormal; Notable for the following components:       Result Value    Immature Granulocytes 0.7 (*)     Immature Grans (Abs) 0.07 (*)     Mono # 1.1 (*)     Lymph % 17.9 (*)     All other components within normal limits   LACTIC ACID, PLASMA - Abnormal; Notable for the following components:    Lactate (Lactic Acid) 2.3 (*)     All other components within normal limits   COMPREHENSIVE METABOLIC PANEL - Abnormal; Notable for the following components:    Sodium 135 (*)     CO2 21 (*)     Albumin 3.4 (*)     All other components within normal limits   SEDIMENTATION RATE - Abnormal; Notable for the following components:    Sed Rate 56 (*)     All other components within normal limits   C-REACTIVE PROTEIN - Abnormal; Notable for the following components:    CRP 15.6 (*)     All other components within normal limits   CULTURE, BLOOD   CULTURE, BLOOD   HIV 1 / 2 ANTIBODY    Narrative:     Release to patient->Immediate   HEPATITIS C ANTIBODY    Narrative:     Release to patient->Immediate   SARS-COV-2 RNA AMPLIFICATION, QUAL   HEMOGLOBIN A1C   URINALYSIS, REFLEX TO URINE CULTURE          Imaging Results              X-Ray Foot Complete Right (Final result)  Result time 11/22/22 12:58:55      Final result by Wilbert French MD (11/22/22 12:58:55)                   Impression:      See above      Electronically signed by: Wilbert French MD  Date:    11/22/2022  Time:    12:58               Narrative:    EXAMINATION:  XR FOOT COMPLETE 3 VIEW RIGHT    CLINICAL HISTORY:  . Pain in right foot    TECHNIQUE:  AP, lateral, and oblique views of the right foot were performed.    COMPARISON:  N 06/09/2022 one    FINDINGS:  Amputation of the 5th toe and distal aspect of the 5th metatarsal bone identified as before.  DJD.  No acute fracture or bone destruction identified.  Bony spur seen arising from the plantar and dorsal aspect of the calcaneus.                                       Medications   cefepime in dextrose 5 %  IVPB 2 g (has no administration in time range)   vancomycin (VANCOCIN) 2,500 mg in dextrose 5 % 500 mL IVPB (2,500 mg Intravenous New Bag 11/22/22 1431)   albuterol-ipratropium 2.5 mg-0.5 mg/3 mL nebulizer solution 3 mL (has no administration in time range)   melatonin tablet 6 mg (has no administration in time range)   ondansetron disintegrating tablet 8 mg (has no administration in time range)   promethazine tablet 25 mg (has no administration in time range)   polyethylene glycol packet 17 g (has no administration in time range)   bisacodyL suppository 10 mg (has no administration in time range)   acetaminophen tablet 650 mg (has no administration in time range)   naloxone 0.4 mg/mL injection 0.02 mg (has no administration in time range)   glucose chewable tablet 16 g (has no administration in time range)   glucose chewable tablet 24 g (has no administration in time range)   glucagon (human recombinant) injection 1 mg (has no administration in time range)   dextrose 10% bolus 125 mL (has no administration in time range)   dextrose 10% bolus 250 mL (has no administration in time range)   sodium chloride 0.9% flush 10 mL (has no administration in time range)   heparin (porcine) injection 5,000 Units (has no administration in time range)   oxyCODONE immediate release tablet 5 mg (has no administration in time range)   oxyCODONE immediate release tablet 10 mg (has no administration in time range)   insulin aspart U-100 pen 3 Units (has no administration in time range)   insulin detemir U-100 pen 9 Units (has no administration in time range)   ascorbic acid (vitamin C) tablet 500 mg (has no administration in time range)   aspirin EC tablet 81 mg (has no administration in time range)   atorvastatin tablet 80 mg (has no administration in time range)   carvediloL tablet 12.5 mg (has no administration in time range)   lisinopriL tablet 40 mg (has no administration in time range)   sodium chloride 0.9% bolus 1,000 mL (1,000  impairments found/functional limitations in following categories/risk reduction/prevention/rehab potential/therapy frequency/predicted duration of therapy intervention/anticipated equipment needs at discharge/anticipated discharge recommendation mLs Intravenous Bolus from Bag 11/22/22 1425)   diphenhydrAMINE capsule 25 mg (25 mg Oral Given 11/22/22 1531)           APC / Resident Notes:   Patient presenting with infected diabetic foot ulcer involving right third toe, referred from podiatry clinic due to worsening appearance and malodorous drainage despite home antibiotics x 1 week.  He is on cipro and doxycycline at home for 1 week.  Podiatry advised admission and likely amputation, most recent amputation 6/2022.    I have obtained blood work, WBC is 10.62, lactic acid elevated mildly to 2.3.  Patient has normal vital signs, afebrile and nontoxic appearing.   I have ordered Vancomycin , Cefepime, 1 liter IV fluids.  ESR, CRP  mildly elevated, otherwise labs largely WNL.   I have consulted Podiatry and they have evaluated the patient in the ER, will plan for likely MRI of the foot for eval of possible osteomyelitis and surgical planning.   Patient agreeable for plan for admission.  I discussed patient's presentation and admission with DARVIN Dunham with Hospital Medicine, she is agreed to admit the patient to staff Dr. Tomlin.      I discussed the care of this pt with my supervising MD.                     Clinical Impression:   Final diagnoses:  [M79.671] Right foot pain        ED Disposition Condition    Admit Stable                DARVIN Whitmore  11/22/22 3858

## 2023-08-21 NOTE — CARE COORDINATION ASSESSMENT. - NSCAREPROVIDERS_GEN_ALL_CORE_FT
CARE PROVIDERS:  Accepting Physician: Martin Simpson  Access Services: Cynthia Bowers  Administration: Shea Alex  Administration: Luis Wong  Admitting: Martin Simpson  Attending: Martin Simpson  Case Management: Tony Dacosta  Case Management: Al Kapadia  Consultant: Nayeli Noyola  Covering Team: Martin Simpson  ED Attending: Oren Fang ED Nurse: Kervin Harris  Nurse: Melissa Kaur  Nurse: Meagan Palmer  Ordered: ADM, User  Override: Brigid Banuelos  PCA/Nursing Assistant: Hui Adair  Physical Therapy: Nakul Juarez  Primary Team: Christine Staples  Primary Team: Zachary Vásquez  Primary Team: Kymberly Tyler  : Arminda Marmolejo  : Joya Henry

## 2023-08-21 NOTE — PROGRESS NOTE ADULT - PROBLEM SELECTOR PLAN 2
- CT AP: No hydronephrosis or obstructing stone. Bladder prolapse with wall thickening suggesting cystitis. Correlate with urinalysis.  - S/P manual bladder prolapse reduction in ED  - Urology (Dr Paez), following
- CT AP: No hydronephrosis or obstructing stone. Bladder prolapse with wall thickening suggesting cystitis. Correlate with urinalysis.  - S/P manual bladder prolapse reduction in ED

## 2023-08-21 NOTE — CARE COORDINATION ASSESSMENT. - REASON FOR CONSULT
Pt is Occitan speaking - met with pt and son at bedside and discussed mental health concerns. Pt was provided with [employer] of mental health resources. Son also requested resources for elder care  for possible transition to Medicaid to obtain HHA. SW provided resources and will follow/ remain available for any needs./mental health issues Pt is Croatian speaking - met with pt and son at bedside and discussed mental health concerns. Pt was provided with [employer] of mental health resources. Son also requested resources for elder care  for possible transition to Medicaid to obtain HHA. SW provided resources and will follow/ remain available for any needs./mental health issues Pt is Tajik speaking - met with pt and son at bedside and discussed mental health concerns. Pt was provided with [employer] of mental health resources. Son also requested resources for elder care  for possible transition to Medicaid to obtain HHA. SW provided resources and will follow/ remain available for any needs./mental health issues

## 2023-08-22 ENCOUNTER — TRANSCRIPTION ENCOUNTER (OUTPATIENT)
Age: 73
End: 2023-08-22

## 2023-08-22 VITALS
TEMPERATURE: 98 F | HEART RATE: 88 BPM | SYSTOLIC BLOOD PRESSURE: 133 MMHG | DIASTOLIC BLOOD PRESSURE: 78 MMHG | RESPIRATION RATE: 17 BRPM | OXYGEN SATURATION: 95 %

## 2023-08-22 LAB
ALBUMIN SERPL ELPH-MCNC: 2.8 G/DL — LOW (ref 3.3–5)
ALP SERPL-CCNC: 81 U/L — SIGNIFICANT CHANGE UP (ref 40–120)
ALT FLD-CCNC: 10 U/L — LOW (ref 12–78)
ANION GAP SERPL CALC-SCNC: 6 MMOL/L — SIGNIFICANT CHANGE UP (ref 5–17)
AST SERPL-CCNC: 41 U/L — HIGH (ref 15–37)
BASOPHILS # BLD AUTO: 0.04 K/UL — SIGNIFICANT CHANGE UP (ref 0–0.2)
BASOPHILS NFR BLD AUTO: 0.5 % — SIGNIFICANT CHANGE UP (ref 0–2)
BILIRUB SERPL-MCNC: 0.3 MG/DL — SIGNIFICANT CHANGE UP (ref 0.2–1.2)
BUN SERPL-MCNC: 17 MG/DL — SIGNIFICANT CHANGE UP (ref 7–23)
CALCIUM SERPL-MCNC: 8.6 MG/DL — SIGNIFICANT CHANGE UP (ref 8.5–10.1)
CHLORIDE SERPL-SCNC: 108 MMOL/L — SIGNIFICANT CHANGE UP (ref 96–108)
CO2 SERPL-SCNC: 27 MMOL/L — SIGNIFICANT CHANGE UP (ref 22–31)
CREAT SERPL-MCNC: 0.55 MG/DL — SIGNIFICANT CHANGE UP (ref 0.5–1.3)
EGFR: 96 ML/MIN/1.73M2 — SIGNIFICANT CHANGE UP
EOSINOPHIL # BLD AUTO: 0.2 K/UL — SIGNIFICANT CHANGE UP (ref 0–0.5)
EOSINOPHIL NFR BLD AUTO: 2.3 % — SIGNIFICANT CHANGE UP (ref 0–6)
GLUCOSE SERPL-MCNC: 99 MG/DL — SIGNIFICANT CHANGE UP (ref 70–99)
HCT VFR BLD CALC: 36 % — SIGNIFICANT CHANGE UP (ref 34.5–45)
HGB BLD-MCNC: 11.5 G/DL — SIGNIFICANT CHANGE UP (ref 11.5–15.5)
IMM GRANULOCYTES NFR BLD AUTO: 0.5 % — SIGNIFICANT CHANGE UP (ref 0–0.9)
LYMPHOCYTES # BLD AUTO: 2.08 K/UL — SIGNIFICANT CHANGE UP (ref 1–3.3)
LYMPHOCYTES # BLD AUTO: 23.5 % — SIGNIFICANT CHANGE UP (ref 13–44)
MCHC RBC-ENTMCNC: 28.3 PG — SIGNIFICANT CHANGE UP (ref 27–34)
MCHC RBC-ENTMCNC: 31.9 GM/DL — LOW (ref 32–36)
MCV RBC AUTO: 88.5 FL — SIGNIFICANT CHANGE UP (ref 80–100)
MONOCYTES # BLD AUTO: 0.7 K/UL — SIGNIFICANT CHANGE UP (ref 0–0.9)
MONOCYTES NFR BLD AUTO: 7.9 % — SIGNIFICANT CHANGE UP (ref 2–14)
NEUTROPHILS # BLD AUTO: 5.8 K/UL — SIGNIFICANT CHANGE UP (ref 1.8–7.4)
NEUTROPHILS NFR BLD AUTO: 65.3 % — SIGNIFICANT CHANGE UP (ref 43–77)
NRBC # BLD: 0 /100 WBCS — SIGNIFICANT CHANGE UP (ref 0–0)
PLATELET # BLD AUTO: 328 K/UL — SIGNIFICANT CHANGE UP (ref 150–400)
POTASSIUM SERPL-MCNC: 4.4 MMOL/L — SIGNIFICANT CHANGE UP (ref 3.5–5.3)
POTASSIUM SERPL-SCNC: 4.4 MMOL/L — SIGNIFICANT CHANGE UP (ref 3.5–5.3)
PROT SERPL-MCNC: 7.1 G/DL — SIGNIFICANT CHANGE UP (ref 6–8.3)
RBC # BLD: 4.07 M/UL — SIGNIFICANT CHANGE UP (ref 3.8–5.2)
RBC # FLD: 14.6 % — HIGH (ref 10.3–14.5)
SODIUM SERPL-SCNC: 141 MMOL/L — SIGNIFICANT CHANGE UP (ref 135–145)
WBC # BLD: 8.86 K/UL — SIGNIFICANT CHANGE UP (ref 3.8–10.5)
WBC # FLD AUTO: 8.86 K/UL — SIGNIFICANT CHANGE UP (ref 3.8–10.5)

## 2023-08-22 PROCEDURE — 80061 LIPID PANEL: CPT

## 2023-08-22 PROCEDURE — 96361 HYDRATE IV INFUSION ADD-ON: CPT

## 2023-08-22 PROCEDURE — 87086 URINE CULTURE/COLONY COUNT: CPT

## 2023-08-22 PROCEDURE — 81001 URINALYSIS AUTO W/SCOPE: CPT

## 2023-08-22 PROCEDURE — 93005 ELECTROCARDIOGRAM TRACING: CPT

## 2023-08-22 PROCEDURE — 86803 HEPATITIS C AB TEST: CPT

## 2023-08-22 PROCEDURE — 80053 COMPREHEN METABOLIC PANEL: CPT

## 2023-08-22 PROCEDURE — 96374 THER/PROPH/DIAG INJ IV PUSH: CPT

## 2023-08-22 PROCEDURE — 85025 COMPLETE CBC W/AUTO DIFF WBC: CPT

## 2023-08-22 PROCEDURE — 87635 SARS-COV-2 COVID-19 AMP PRB: CPT

## 2023-08-22 PROCEDURE — 97116 GAIT TRAINING THERAPY: CPT

## 2023-08-22 PROCEDURE — 36415 COLL VENOUS BLD VENIPUNCTURE: CPT

## 2023-08-22 PROCEDURE — 87040 BLOOD CULTURE FOR BACTERIA: CPT

## 2023-08-22 PROCEDURE — 85610 PROTHROMBIN TIME: CPT

## 2023-08-22 PROCEDURE — 70450 CT HEAD/BRAIN W/O DYE: CPT

## 2023-08-22 PROCEDURE — 99285 EMERGENCY DEPT VISIT HI MDM: CPT

## 2023-08-22 PROCEDURE — 85730 THROMBOPLASTIN TIME PARTIAL: CPT

## 2023-08-22 PROCEDURE — 97530 THERAPEUTIC ACTIVITIES: CPT

## 2023-08-22 PROCEDURE — 97162 PT EVAL MOD COMPLEX 30 MIN: CPT

## 2023-08-22 PROCEDURE — 74176 CT ABD & PELVIS W/O CONTRAST: CPT | Mod: MA

## 2023-08-22 PROCEDURE — 99239 HOSP IP/OBS DSCHRG MGMT >30: CPT

## 2023-08-22 PROCEDURE — 83605 ASSAY OF LACTIC ACID: CPT

## 2023-08-22 RX ADMIN — SERTRALINE 150 MILLIGRAM(S): 25 TABLET, FILM COATED ORAL at 12:13

## 2023-08-22 RX ADMIN — ENOXAPARIN SODIUM 40 MILLIGRAM(S): 100 INJECTION SUBCUTANEOUS at 05:43

## 2023-08-22 RX ADMIN — Medication 10 MILLIGRAM(S): at 05:41

## 2023-08-22 RX ADMIN — ERTAPENEM SODIUM 120 MILLIGRAM(S): 1 INJECTION, POWDER, LYOPHILIZED, FOR SOLUTION INTRAMUSCULAR; INTRAVENOUS at 05:41

## 2023-08-22 RX ADMIN — MEGESTROL ACETATE 400 MILLIGRAM(S): 40 SUSPENSION ORAL at 12:13

## 2023-08-22 RX ADMIN — CARBIDOPA AND LEVODOPA 1 TABLET(S): 25; 100 TABLET ORAL at 05:41

## 2023-08-22 RX ADMIN — CARBIDOPA AND LEVODOPA 1 TABLET(S): 25; 100 TABLET ORAL at 12:12

## 2023-08-22 NOTE — CAREGIVER ENGAGEMENT NOTE - CAREGIVER OUTREACH NOTES - FREE TEXT
Met with patient and spouse at bedside.  Confirmed pt was not receiving home services PTA, she was admitted with a Mallory catheter, family was providing care.  He is aware PT recommends home PT and that MD will provide a prescription for home PT ( given to spouse), and a partial list of agencies is included in her d/c summary with instructions to call with insurance info and to provide them with the original prescription.

## 2023-08-22 NOTE — CASE MANAGEMENT PROGRESS NOTE - NSCMPROGRESSNOTE_GEN_ALL_CORE
CM consult noted for health literacy and managing medical conditions at home.  Discussed on rounds.  Pt is anticiapted for d/c today, PT recommendation for home PT, spoke with spouse, family was managing rod catheter.  Spouse in agreement with d/c plan for home today.

## 2023-08-22 NOTE — DISCHARGE NOTE PROVIDER - NSDCCPCAREPLAN_GEN_ALL_CORE_FT
PRINCIPAL DISCHARGE DIAGNOSIS  Diagnosis: Acute UTI  Assessment and Plan of Treatment: You completed antibiotics while admitted. Recommend following up with your outpatient doctors after discharge. Continue your regular medications      SECONDARY DISCHARGE DIAGNOSES  Diagnosis: Female bladder prolapse  Assessment and Plan of Treatment: Follow up with urology as outpatient.

## 2023-08-22 NOTE — DISCHARGE NOTE PROVIDER - PROVIDER TOKENS
PROVIDER:[TOKEN:[[id number]],FOLLOWUP:[1 week],ESTABLISHEDPATIENT:[T]] PROVIDER:[TOKEN:[[id number]],FOLLOWUP:[1 week],ESTABLISHEDPATIENT:[T]] PROVIDER:[TOKEN:[[id number]],FOLLOWUP:[1 week],ESTABLISHEDPATIENT:[T]]

## 2023-08-22 NOTE — DISCHARGE NOTE PROVIDER - HOSPITAL COURSE
FROM ADMISSION H+P:   HPI:  74yo F w/ PMHx Parkinson's disease, anxiety/depression, HLD, bladder presents to ED c/o Mallory catheter w/ bloody urine. Per discussion w/ son at bedside, pt recently admitted at [facility] from 8/14-8/17 for urinary retention. She was discharged w/ Mallory and Bactrim. Pt's son reports that pt accidentally tugged on her Mallory and displaced it on the evening of 8/18.     ---  HOSPITAL COURSE:   Pt admitted with concern for UTI. Cultures were neg. Likely sterilized by outpatient use of abx. Given 3d of ertapenem. No other events occurred. H/h is stable. Renal indices are stable. Pt is agreeable with outpatient management. No focal complaints at prseent. Needs outpatient  followup. Pt also had bladder prolapse which was reduced by ED attending.     ---  CONSULTANTS:   ID (Dennys)  PT     FROM ADMISSION H+P:   HPI:  74yo F w/ PMHx Parkinson's disease, anxiety/depression, HLD, bladder presents to ED c/o Mallory catheter w/ bloody urine. Per discussion w/ son at bedside, pt recently admitted at [facility] from 8/14-8/17 for urinary retention. She was discharged w/ Mallory and Bactrim. Pt's son reports that pt accidentally tugged on her Mallory and displaced it on the evening of 8/18.     ---  HOSPITAL COURSE:   Pt admitted with concern for UTI. Cultures were neg. Likely sterilized by outpatient use of abx. Given 3d of ertapenem. No other events occurred. H/h is stable. Renal indices are stable. Pt is agreeable with outpatient management. No focal complaints at prseent. Needs outpatient  followup. Pt also had bladder prolapse which was reduced by ED attending.     ---  CONSULTANTS:   ID (Dennys)  PT     FROM ADMISSION H+P:   HPI:  72yo F w/ PMHx Parkinson's disease, anxiety/depression, HLD, bladder presents to ED c/o Mallory catheter w/ bloody urine. Per discussion w/ son at bedside, pt recently admitted at [facility] from 8/14-8/17 for urinary retention. She was discharged w/ Mallory and Bactrim. Pt's son reports that pt accidentally tugged on her Mallory and displaced it on the evening of 8/18.     ---  HOSPITAL COURSE:   Pt admitted with concern for UTI. Cultures were neg. Likely sterilized by outpatient use of abx. Given 3d of ertapenem. No other events occurred. H/h is stable. Renal indices are stable. Pt is agreeable with outpatient management. No focal complaints at prseent. Needs outpatient  followup. Pt also had bladder prolapse which was reduced by ED attending.     ---  CONSULTANTS:   ID (Dennys)  PT

## 2023-08-22 NOTE — DISCHARGE NOTE PROVIDER - NSDCMRMEDTOKEN_GEN_ALL_CORE_FT
ATORVASTATIN 10MG TABLETS:   BACLOFEN 10MG TABLETS: TAKE 1 TABLET BY MOUTH THREE TIMES DAILY  CARBIDOPA/LEVODOPA 25MG-250MG TABS: TAKE 1 TABLET BY MOUTH FOUR TIMES DAILY  MEGESTROL ACETATE 40MG/ML SUSP: SHAKE LIQUID AND TAKE 10 ML BY MOUTH TWICE DAILY  MIRTAZAPINE 30MG TABLETS: TAKE 1 TABLET BY MOUTH AT BEDTIME  SERTRALINE 100MG TABLETS: TAKE 1 AND 1/2 TABLETS BY MOUTH EVERY DAY  VITAMIN D2 [id number] (ERGO) CAP RX: TAKE 1 CAPSULE BY MOUTH EVERY WEEK   ATORVASTATIN 10MG TABLETS:   BACLOFEN 10MG TABLETS: TAKE 1 TABLET BY MOUTH THREE TIMES DAILY  CARBIDOPA/LEVODOPA 25MG-250MG TABS: TAKE 1 TABLET BY MOUTH FOUR TIMES DAILY  MEGESTROL ACETATE 40MG/ML SUSP: SHAKE LIQUID AND TAKE 10 ML BY MOUTH TWICE DAILY  MIRTAZAPINE 30MG TABLETS: TAKE 1 TABLET BY MOUTH AT BEDTIME  SERTRALINE 100MG TABLETS: TAKE 1 AND 1/2 TABLETS BY MOUTH EVERY DAY  VITAMIN D2 [id number] (ERGO) CAP RX: TAKE 1 CAPSULE BY MOUTH EVERY WEEK   ATORVASTATIN 10MG TABLETS:   BACLOFEN 10MG TABLETS: TAKE 1 TABLET BY MOUTH THREE TIMES DAILY  CARBIDOPA/LEVODOPA 25MG-250MG TABS: TAKE 1 TABLET BY MOUTH FOUR TIMES DAILY  MEGESTROL ACETATE 40MG/ML SUSP: SHAKE LIQUID AND TAKE 10 ML BY MOUTH TWICE DAILY  MIRTAZAPINE 30MG TABLETS: TAKE 1 TABLET BY MOUTH AT BEDTIME  SERTRALINE 100MG TABLETS: TAKE 1 AND 1/2 TABLETS BY MOUTH EVERY DAY  VITAMIN D2 [id number] (ERGO) CAP RX: TAKE 1 CAPSULE BY MOUTH EVERY WEEK

## 2023-08-22 NOTE — DISCHARGE NOTE PROVIDER - CARE PROVIDER_API CALL
Antonette Liu  [address] [address]  Phone: [phone]  Fax: [phone]  Established Patient  Follow Up Time: 1 week   Antonette Liu  [address] [address]  Phone: [phone]  Fax: [phone]  Established Patient  Follow Up Time: 1 week   Antonette Liu  [address] [address]  Phone: [phone]  Fax: [phone]  Established Patient  Follow Up Time: 1 week

## 2023-08-22 NOTE — DISCHARGE NOTE NURSING/CASE MANAGEMENT/SOCIAL WORK - NSDCPEFALRISK_GEN_ALL_CORE
For information on Fall & Injury Prevention, visit: https://www.[facility]/news/fall-prevention-protects-and-maintains-health-and-mobility OR  https://www.[facility]/news/fall-prevention-tips-to-avoid-injury OR  https://www.cdc.gov/steadi/patient.html For information on Fall & Injury Prevention, visit: https://www.[facility]/news/fall-prevention-protects-and-maintains-health-and-mobility OR  https://www.[facility]/news/fall-prevention-tips-to-avoid-injury OR  https://www.cdc.gov/steadi/patient.html For information on Fall & Injury Prevention, visit: https://www.[facility]/news/fall-prevention-protects-and-maintains-health-and-mobility OR  https://www.[facility]/news/fall-prevention-tips-to-avoid-injury OR  https://www.cdc.gov/steadi/patient.html

## 2023-08-22 NOTE — CHART NOTE - NSCHARTNOTEFT_GEN_A_CORE
The patient was seen and examined on the day of discharge by the attending physician. Pt stable for discharge. Please see discharge note for additional information regarding the hospital course and the day of discharge.     Pt speaks Citizen of [place] only. Dent  services were utilized for my interaction with the patient.  # [id number] The patient was seen and examined on the day of discharge by the attending physician. Pt stable for discharge. Please see discharge note for additional information regarding the hospital course and the day of discharge.     Pt speaks Mauritian only. [place]  services were utilized for my interaction with the patient.  # [id number] The patient was seen and examined on the day of discharge by the attending physician. Pt stable for discharge. Please see discharge note for additional information regarding the hospital course and the day of discharge.     Pt speaks British only. [place]  services were utilized for my interaction with the patient.  # [id number]

## 2023-08-22 NOTE — DISCHARGE NOTE NURSING/CASE MANAGEMENT/SOCIAL WORK - PATIENT PORTAL LINK FT
You can access the FollowMyHealth Patient Portal offered by [facility] by registering at the following website: [email or web address]. By joining [employer]’s FollowMyHealth portal, you will also be able to view your health information using other applications (apps) compatible with our system. You can access the FollowMyHealth Patient Portal offered by [facility] by registering at the following website: [email or web address]. By joining [employer]’s FollowMyHealth portal, you will also be able to view your health information using other applications (apps) compatible with our system. You can access the FollowMyHealth Patient Portal offered by [facility] by registering at the following website: [email or web address]. By joining [employer]’s FollowMyHealth portal, you will also be able to view your health information using other applications (apps) compatible with our system.

## 2023-08-24 ENCOUNTER — APPOINTMENT (OUTPATIENT)
Dept: INTERNAL MEDICINE | Facility: CLINIC | Age: 73
End: 2023-08-24
Payer: MEDICARE

## 2023-08-24 VITALS
SYSTOLIC BLOOD PRESSURE: 116 MMHG | TEMPERATURE: 98.6 F | DIASTOLIC BLOOD PRESSURE: 71 MMHG | WEIGHT: 106 LBS | OXYGEN SATURATION: 96 % | HEART RATE: 97 BPM | HEIGHT: 61 IN | BODY MASS INDEX: 20.01 KG/M2

## 2023-08-24 PROCEDURE — 99496 TRANSJ CARE MGMT HIGH F2F 7D: CPT

## 2023-08-24 NOTE — HISTORY OF PRESENT ILLNESS
[Post-hospitalization from ___ Hospital] : Post-hospitalization from [unfilled] Hospital [Discharged on ___] : discharged on [unfilled] [Discharge Summary] : discharge summary [FreeTextEntry2] : 73-year-old female hx of. Parkinson's disease, anxiety/depression, HLD here s/p hospital d/c. Patient was admitted to [place] 8/14-8/17 for urinary retention. She was discharged w/ Mallory and Bactrim. Pt's son reports that accidentally tugged on her Mallory and displaced it on the evening of 8/18 Pt admitted to [facility] for concern for UTI. Cultures were negative. Pt also had. bladder prolapse which was reduced by ED attending. patient now denies any pain feels well. urine is yellow, clear no blood.

## 2023-08-25 LAB
CULTURE RESULTS: SIGNIFICANT CHANGE UP
SPECIMEN SOURCE: SIGNIFICANT CHANGE UP

## 2023-08-28 ENCOUNTER — APPOINTMENT (OUTPATIENT)
Dept: UROLOGY | Facility: CLINIC | Age: 73
End: 2023-08-28
Payer: MEDICARE

## 2023-08-28 VITALS
DIASTOLIC BLOOD PRESSURE: 70 MMHG | HEART RATE: 74 BPM | SYSTOLIC BLOOD PRESSURE: 118 MMHG | OXYGEN SATURATION: 98 % | RESPIRATION RATE: 149 BRPM | TEMPERATURE: 97.5 F | HEIGHT: 62 IN | BODY MASS INDEX: 19.14 KG/M2 | WEIGHT: 104 LBS

## 2023-08-28 PROCEDURE — 99202 OFFICE O/P NEW SF 15 MIN: CPT

## 2023-08-28 NOTE — HISTORY OF PRESENT ILLNESS
[FreeTextEntry1] : 74y/o female with indwelling cath for 5 days. The patient had a urinary retention and referred to the ED, was put under ABX for sepsis. After discharge she stopped taking ABX. Comes today after hysterectomy and with a bladder prolapse, most probably causing the reported condition.

## 2023-08-28 NOTE — ASSESSMENT
[FreeTextEntry1] : 74y/o female with indwelling cath for 5 days. The patient had a urinary retention and referred to the ED, was put under ABX for sepsis. After discharge she stopped taking ABX. Comes today after hysterectomy and with a bladder prolapse, most probably causing the reported condition.  The patient is referred to URO GYN for F/U and eventual surgical evaluation.

## 2023-08-28 NOTE — REVIEW OF SYSTEMS
[Feeling Tired] : feeling tired [Urine retention] : urine retention [Strain or push to urinate] : strain or push to urinate [Wait a long time to urinate] : waits a long time to urinate [Slow urine stream] : slow urine stream [Difficulty Walking] : difficulty walking [Anxiety] : anxiety [Muscle Weakness] : muscle weakness [Feelings Of Weakness] : feelings of weakness [Negative] : Heme/Lymph

## 2023-08-28 NOTE — PHYSICAL EXAM
[General Appearance - Well Developed] : well developed [General Appearance - Well Nourished] : well nourished [Normal Appearance] : normal appearance [Well Groomed] : well groomed [General Appearance - In No Acute Distress] : no acute distress [Edema] : no peripheral edema [Respiration, Rhythm And Depth] : normal respiratory rhythm and effort [Exaggerated Use Of Accessory Muscles For Inspiration] : no accessory muscle use [Abdomen Soft] : soft [Abdomen Tenderness] : non-tender [Costovertebral Angle Tenderness] : no ~M costovertebral angle tenderness [FreeTextEntry1] : bladder prolapse. Has indwelling cath. [Normal Station and Gait] : the gait and station were normal for the patient's age [] : no rash [No Focal Deficits] : no focal deficits [No Palpable Adenopathy] : no palpable adenopathy

## 2023-08-29 ENCOUNTER — NON-APPOINTMENT (OUTPATIENT)
Age: 73
End: 2023-08-29

## 2023-09-01 ENCOUNTER — APPOINTMENT (OUTPATIENT)
Dept: UROLOGY | Facility: CLINIC | Age: 73
End: 2023-09-01
Payer: MEDICARE

## 2023-09-01 VITALS
SYSTOLIC BLOOD PRESSURE: 120 MMHG | TEMPERATURE: 97.9 F | DIASTOLIC BLOOD PRESSURE: 74 MMHG | OXYGEN SATURATION: 95 % | HEART RATE: 88 BPM

## 2023-09-01 PROCEDURE — 99215 OFFICE O/P EST HI 40 MIN: CPT

## 2023-09-03 NOTE — PHYSICAL EXAM
[Well Groomed] : well groomed [Abdomen Soft] : soft [FreeTextEntry1] : significant urethral prolapse; stage IV prolapse of bladder and vaginal apex; rod secure draining clear urine  [Abdomen Tenderness] : non-tender

## 2023-09-03 NOTE — HISTORY OF PRESENT ILLNESS
[FreeTextEntry1] : 73F presents for initial evaluation of urinary retention, bladder prolapse  Referred by Dr. Talley    [facility] significant for: asthma, parkinsons, prediabetes, osteoporosis, HTN  PSH significant for: total hysterectomy, ?unknown prolapse repair   Significant meds: sertraline, baclofen, mirtazapine, boniva, carbidopa-levodopa    Reports years long history of pelvic organ prolapse Associated with moderate bother Reports previous treatment with vaginal hysterectomy and possible unknown prolapse repair Denies associated incontinence or difficulty urinating until recently Recently admitted to the hospital for urinary retention, Mallory catheter placed Seen again in [place] ED for hematuria concerns with Mallory  Presents for discussion of definitive management of prolapse as well as Mallory management  Cr: 0.55, Ucx: no growth

## 2023-09-03 NOTE — ASSESSMENT
[FreeTextEntry1] : Ms. Sharif presents for initial evaluation of stage IV prolapse and urinary retention (new diagnosis, uncertain prognosis) Patient's  is present as independent historian Hospital notes and labs reviewed (see above) Patient is currently Mallory dependent given the severity of her prolapse. Exam is also notable for a significant urethral prolapse that does not appear thrombosed Reports a years long history of prolapse previously managed with a vaginal hysterectomy and possible unknown reconstructive procedure  Pessary fitting attempted today however patient failed.  Patient expelled size 3, 4, 5 ring pessaries with support. Resultantly, Mallory was left in place to ensure bladder emptying.  Patient is interested in definitive management surgically. I reviewed that pessary placement would allow for spontaneous voiding sans catheter in the interim.  Discussed Mallory care and expectations with family at length.  Reviewed bladder spasms which cause leakage around the catheter, hematuria as a result of Mallory pulling or manipulation.  Recommendations -Continue Mallory to gravity -RTC next week for repeat pessary fitting with Sudhakar

## 2023-09-05 ENCOUNTER — APPOINTMENT (OUTPATIENT)
Dept: UROLOGY | Facility: CLINIC | Age: 73
End: 2023-09-05
Payer: MEDICARE

## 2023-09-05 VITALS
HEIGHT: 62 IN | BODY MASS INDEX: 19.14 KG/M2 | WEIGHT: 104 LBS | SYSTOLIC BLOOD PRESSURE: 143 MMHG | HEART RATE: 91 BPM | DIASTOLIC BLOOD PRESSURE: 81 MMHG | OXYGEN SATURATION: 95 %

## 2023-09-05 DIAGNOSIS — N95.2 POSTMENOPAUSAL ATROPHIC VAGINITIS: ICD-10-CM

## 2023-09-05 PROCEDURE — 51798 US URINE CAPACITY MEASURE: CPT

## 2023-09-05 PROCEDURE — 99215 OFFICE O/P EST HI 40 MIN: CPT

## 2023-09-05 RX ORDER — ESTRADIOL 0.1 MG/G
0.1 CREAM VAGINAL
Qty: 1 | Refills: 3 | Status: ACTIVE | COMMUNITY
Start: 2023-09-05 | End: 1900-01-01

## 2023-09-05 NOTE — PHYSICAL EXAM
[Well Groomed] : well groomed [Abdomen Soft] : soft [Abdomen Tenderness] : non-tender [FreeTextEntry1] : significant urethral prolapse; stage IV prolapse of bladder and vaginal apex; rod secure draining clear urine. Filled with 180 cc NS, DO and leakage during ambulation to bathroom. Unable to void on toilet. PVR: 20

## 2023-09-05 NOTE — HISTORY OF PRESENT ILLNESS
[FreeTextEntry1] : 73F presents for initial evaluation of urinary retention, bladder prolapse  Referred by Dr. Talley    [facility] significant for: asthma, parkinsons, prediabetes, osteoporosis, HTN  PSH significant for: total hysterectomy, ?unknown prolapse repair   Significant meds: sertraline, baclofen, mirtazapine, boniva, carbidopa-levodopa    Reports years long history of pelvic organ prolapse Associated with moderate bother Reports previous treatment with vaginal hysterectomy and possible unknown prolapse repair Denies associated incontinence or difficulty urinating until recently Recently admitted to the hospital for urinary retention, Mallory catheter placed Seen again in [place] ED for hematuria concerns with Mallory  Presents for discussion of definitive management of prolapse as well as Mallory management Failed fitting with ring pessary last week.  Presents for repeat attempt with Gelhorn  Cr: 0.55, Ucx: no growth

## 2023-09-05 NOTE — ASSESSMENT
[FreeTextEntry1] : Ms. Sharif presents for follow up evaluation of stage IV prolapse and urinary retention (new diagnosis, uncertain prognosis) Patient's  and son are present as independent historians Patient is currently Mallory dependent given the severity of her prolapse. Exam is also notable for a significant urethral prolapse that does not appear thrombosed Reports a years long history of prolapse previously managed with a vaginal hysterectomy and possible unknown reconstructive procedure  Pessary fitting successful with #4 Gelhorn short stem. Mallory removed for void trial, PVR: 20 SHARI & ISD noted on exam  Discussed pessary care and expectations with family at length.  Reviewed risks and benefits of concomitant estrace use. Patient has no h/o of GYN malignancy, fatty liver disease. No contraindication to use. Application instructions and need to maintain vaginal tissues discussed.  All questions answered.  Recommendations -RTC 2 weeks for re-eval, discussion of definitive surgical management

## 2023-09-07 ENCOUNTER — APPOINTMENT (OUTPATIENT)
Dept: UROLOGY | Facility: CLINIC | Age: 73
End: 2023-09-07
Payer: MEDICARE

## 2023-09-07 VITALS
WEIGHT: 104 LBS | BODY MASS INDEX: 19.14 KG/M2 | TEMPERATURE: 97.8 F | SYSTOLIC BLOOD PRESSURE: 127 MMHG | HEIGHT: 62 IN | HEART RATE: 85 BPM | OXYGEN SATURATION: 96 % | DIASTOLIC BLOOD PRESSURE: 70 MMHG | RESPIRATION RATE: 16 BRPM

## 2023-09-07 PROCEDURE — 99215 OFFICE O/P EST HI 40 MIN: CPT

## 2023-09-08 NOTE — PHYSICAL EXAM
[Well Groomed] : well groomed [Abdomen Soft] : soft [Abdomen Tenderness] : non-tender [FreeTextEntry1] : significant urethral prolapse; stage IV prolapse of bladder and vaginal apex;

## 2023-09-08 NOTE — ASSESSMENT
[FreeTextEntry1] : Ms. Sharif presents for follow up evaluation of stage IV prolapse and urinary retention (new diagnosis, uncertain prognosis) Patient's  and son are present as independent historians Patient is currently Mallory dependent given the severity of her prolapse. Exam is also notable for a significant urethral prolapse that does not appear thrombosed Reports a years long history of prolapse previously managed with a vaginal hysterectomy and possible unknown reconstructive procedure  Pessary fitting successful with #5 Gelhorn short stem.  SHARI & ISD noted on exam  Discussed pessary care and expectations with family at length.  Reviewed risks and benefits of concomitant estrace use. Patient has no h/o of GYN malignancy, fatty liver disease. No contraindication to use. Application instructions and need to maintain vaginal tissues discussed.  All questions answered.  Recommendations -RTC 1 weeks for re-eval, discussion of definitive surgical management

## 2023-09-08 NOTE — HISTORY OF PRESENT ILLNESS
[FreeTextEntry1] : 73F presents for initial evaluation of urinary retention, bladder prolapse  Referred by Dr. Talley    [facility] significant for: asthma, parkinsons, prediabetes, osteoporosis, HTN  PSH significant for: total hysterectomy, ?unknown prolapse repair   Significant meds: sertraline, baclofen, mirtazapine, boniva, carbidopa-levodopa    Reports years long history of pelvic organ prolapse Associated with moderate bother Reports previous treatment with vaginal hysterectomy and possible unknown prolapse repair Denies associated incontinence or difficulty urinating until recently Recently admitted to the hospital for urinary retention, Mallory catheter placed Seen again in [place] ED for hematuria concerns with Mallory  Presents for discussion of definitive management of prolapse as well as Mallory management Failed fitting with ring pessary last week.  Presents for repeat attempt with Gelhorn  Cr: 0.55, Ucx: no growth  Reports since last visit, Gelhorn #4 fell out overnight day of placement Unable to communicate changes to voiding habits

## 2023-09-14 ENCOUNTER — APPOINTMENT (OUTPATIENT)
Dept: UROLOGY | Facility: CLINIC | Age: 73
End: 2023-09-14
Payer: MEDICARE

## 2023-09-14 VITALS
DIASTOLIC BLOOD PRESSURE: 70 MMHG | SYSTOLIC BLOOD PRESSURE: 127 MMHG | WEIGHT: 104 LBS | HEIGHT: 62 IN | HEART RATE: 78 BPM | BODY MASS INDEX: 19.14 KG/M2 | RESPIRATION RATE: 16 BRPM | OXYGEN SATURATION: 98 %

## 2023-09-14 PROCEDURE — 99215 OFFICE O/P EST HI 40 MIN: CPT

## 2023-09-15 ENCOUNTER — APPOINTMENT (OUTPATIENT)
Dept: UROLOGY | Facility: CLINIC | Age: 73
End: 2023-09-15

## 2023-09-25 ENCOUNTER — OUTPATIENT (OUTPATIENT)
Dept: OUTPATIENT SERVICES | Facility: HOSPITAL | Age: 73
LOS: 1 days | End: 2023-09-25
Payer: COMMERCIAL

## 2023-09-25 VITALS
TEMPERATURE: 98 F | OXYGEN SATURATION: 97 % | HEART RATE: 78 BPM | SYSTOLIC BLOOD PRESSURE: 113 MMHG | DIASTOLIC BLOOD PRESSURE: 66 MMHG | HEIGHT: 61 IN | RESPIRATION RATE: 16 BRPM | WEIGHT: 110.01 LBS

## 2023-09-25 DIAGNOSIS — F41.9 ANXIETY DISORDER, UNSPECIFIED: ICD-10-CM

## 2023-09-25 DIAGNOSIS — N81.10 CYSTOCELE, UNSPECIFIED: ICD-10-CM

## 2023-09-25 DIAGNOSIS — Z29.9 ENCOUNTER FOR PROPHYLACTIC MEASURES, UNSPECIFIED: ICD-10-CM

## 2023-09-25 DIAGNOSIS — Z90.710 ACQUIRED ABSENCE OF BOTH CERVIX AND UTERUS: Chronic | ICD-10-CM

## 2023-09-25 DIAGNOSIS — N81.9 FEMALE GENITAL PROLAPSE, UNSPECIFIED: ICD-10-CM

## 2023-09-25 DIAGNOSIS — Z01.818 ENCOUNTER FOR OTHER PREPROCEDURAL EXAMINATION: ICD-10-CM

## 2023-09-25 DIAGNOSIS — G20 PARKINSON'S DISEASE: ICD-10-CM

## 2023-09-25 LAB
ANION GAP SERPL CALC-SCNC: 14 MMOL/L — SIGNIFICANT CHANGE UP (ref 5–17)
BUN SERPL-MCNC: 17 MG/DL — SIGNIFICANT CHANGE UP (ref 7–23)
CALCIUM SERPL-MCNC: 9.3 MG/DL — SIGNIFICANT CHANGE UP (ref 8.4–10.5)
CHLORIDE SERPL-SCNC: 104 MMOL/L — SIGNIFICANT CHANGE UP (ref 96–108)
CO2 SERPL-SCNC: 24 MMOL/L — SIGNIFICANT CHANGE UP (ref 22–31)
CREAT SERPL-MCNC: 0.62 MG/DL — SIGNIFICANT CHANGE UP (ref 0.5–1.3)
EGFR: 93 ML/MIN/1.73M2 — SIGNIFICANT CHANGE UP
GLUCOSE SERPL-MCNC: 82 MG/DL — SIGNIFICANT CHANGE UP (ref 70–99)
HCT VFR BLD CALC: 39.9 % — SIGNIFICANT CHANGE UP (ref 34.5–45)
HGB BLD-MCNC: 12.6 G/DL — SIGNIFICANT CHANGE UP (ref 11.5–15.5)
MCHC RBC-ENTMCNC: 27.8 PG — SIGNIFICANT CHANGE UP (ref 27–34)
MCHC RBC-ENTMCNC: 31.6 GM/DL — LOW (ref 32–36)
MCV RBC AUTO: 87.9 FL — SIGNIFICANT CHANGE UP (ref 80–100)
NRBC # BLD: 0 /100 WBCS — SIGNIFICANT CHANGE UP (ref 0–0)
PLATELET # BLD AUTO: 336 K/UL — SIGNIFICANT CHANGE UP (ref 150–400)
POTASSIUM SERPL-MCNC: 4.2 MMOL/L — SIGNIFICANT CHANGE UP (ref 3.5–5.3)
POTASSIUM SERPL-SCNC: 4.2 MMOL/L — SIGNIFICANT CHANGE UP (ref 3.5–5.3)
RBC # BLD: 4.54 M/UL — SIGNIFICANT CHANGE UP (ref 3.8–5.2)
RBC # FLD: 14.3 % — SIGNIFICANT CHANGE UP (ref 10.3–14.5)
SODIUM SERPL-SCNC: 142 MMOL/L — SIGNIFICANT CHANGE UP (ref 135–145)
WBC # BLD: 8.28 K/UL — SIGNIFICANT CHANGE UP (ref 3.8–10.5)
WBC # FLD AUTO: 8.28 K/UL — SIGNIFICANT CHANGE UP (ref 3.8–10.5)

## 2023-09-25 PROCEDURE — 80048 BASIC METABOLIC PNL TOTAL CA: CPT

## 2023-09-25 PROCEDURE — 85027 COMPLETE CBC AUTOMATED: CPT

## 2023-09-25 PROCEDURE — G0463: CPT

## 2023-09-25 RX ORDER — LIDOCAINE HCL 20 MG/ML
0.2 VIAL (ML) INJECTION ONCE
Refills: 0 | Status: DISCONTINUED | OUTPATIENT
Start: 2023-10-11 | End: 2023-10-25

## 2023-09-25 RX ORDER — SODIUM CHLORIDE 9 MG/ML
3 INJECTION INTRAMUSCULAR; INTRAVENOUS; SUBCUTANEOUS EVERY 8 HOURS
Refills: 0 | Status: DISCONTINUED | OUTPATIENT
Start: 2023-10-11 | End: 2023-10-11

## 2023-09-25 NOTE — H&P PST ADULT - NSICDXPASTMEDICALHX_GEN_ALL_CORE_FT
PAST MEDICAL HISTORY:  Anxiety and depression     Asthma     History of osteoporosis     History of urinary retention     HTN (hypertension)     Hyperlipidemia     No pertinent past medical history     Parkinsons disease     SHARI (stress urinary incontinence, female)     Urethral prolapse      PAST MEDICAL HISTORY:  Anxiety and depression     Asthma     History of osteoporosis     History of urinary retention     HTN (hypertension)     Hyperlipidemia     Parkinsons disease     SHARI (stress urinary incontinence, female)     Urethral prolapse

## 2023-09-25 NOTE — H&P PST ADULT - HEMATOLOGY/LYMPHATICS
predniSONE (DELTASONE) 20 MG tablet 12 tablet 0 10/13/2017     Sig: Take 2 tabs daily (1 tab in AM, 1 tab in PM) for the first 3 days, then 1 tab daily for 3 days, then 1/2 tab daily for 3 days    Class: Normal      Patient stated that he has been having some issues with gout in his right foot and would like refill on this medication.
negative

## 2023-09-25 NOTE — H&P PST ADULT - NEUROLOGICAL COMMENTS
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

## 2023-09-25 NOTE — H&P PST ADULT - HISTORY OF PRESENT ILLNESS
73 year old, [place] speaking female, presents for colpocleisis. Pt. spouse reports she has a history of chronic urethral and pelvic prolapse, and was recently hospitalized for urinary retention requiring rod catheter insertion. Rod was removed and pessary inserted temporarily. Pt. with a history of parkinson's disease with dyskinesia of her head and upper extremities.    History of mild case of COVID-19 in December 2022.

## 2023-09-25 NOTE — H&P PST ADULT - PROBLEM SELECTOR PLAN 3
Instructed to take scheduled dose of baclofen & carbidopa-levodopa morning of procedure with minimal water

## 2023-09-25 NOTE — H&P PST ADULT - ASSESSMENT
Airway  Mallampati      Activity Airway  No dentures or loose teeth  Mallampati      Activity Airway  No dentures or loose teeth  Mallampati (unable to visualize airway due to Parkinsonian muscle stiffness)      Activity  <4 DASI    CAPYUMIKOI VTE 2.0 SCORE [CLOT updated 2019]    AGE RELATED RISK FACTORS                                                       MOBILITY RELATED FACTORS  [ ] Age 41-60 years                                            (1 Point)                    [ ] Bed rest                                                        (1 Point)  [ ] Age: 61-74 years                                           (2 Points)                  [ ] Plaster cast                                                   (2 Points)  [X ] Age= 75 years                                              (3 Points)                    [ ] Bed bound for more than 72 hours                 (2 Points)    DISEASE RELATED RISK FACTORS                                               GENDER SPECIFIC FACTORS  [ ] Edema in the lower extremities                       (1 Point)              [ ] Pregnancy                                                     (1 Point)  [ ] Varicose veins                                               (1 Point)                     [ ] Post-partum < 6 weeks                                   (1 Point)             [ ] BMI > 25 Kg/m2                                            (1 Point)                     [ ] Hormonal therapy  or oral contraception          (1 Point)                 [ ] Sepsis (in the previous month)                        (1 Point)               [ ] History of pregnancy complications                 (1 point)  [ ] Pneumonia or serious lung disease                                               [ ] Unexplained or recurrent                     (1 Point)           (in the previous month)                               (1 Point)  [ ] Abnormal pulmonary function test                     (1 Point)                 SURGERY RELATED RISK FACTORS  [ ] Acute myocardial infarction                              (1 Point)               [ ]  Section                                             (1 Point)  [ ] Congestive heart failure (in the previous month)  (1 Point)      [ ] Minor surgery                                                  (1 Point)   [ ] Inflammatory bowel disease                             (1 Point)               [ ] Arthroscopic surgery                                        (2 Points)  [ ] Central venous access                                      (2 Points)                [X ] General surgery lasting more than 45 minutes (2 points)  [ ] Malignancy- Present or previous                   (2 Points)                [ ] Elective arthroplasty                                         (5 points)    [ ] Stroke (in the previous month)                          (5 Points)                                                                                                                                                           HEMATOLOGY RELATED FACTORS                                                 TRAUMA RELATED RISK FACTORS  [ ] Prior episodes of VTE                                     (3 Points)                [ ] Fracture of the hip, pelvis, or leg                       (5 Points)  [ ] Positive family history for VTE                         (3 Points)             [ ] Acute spinal cord injury (in the previous month)  (5 Points)  [ ] Prothrombin 77780 A                                     (3 Points)               [ ] Paralysis  (less than 1 month)                             (5 Points)  [ ] Factor V Leiden                                             (3 Points)                  [ ] Multiple Trauma within 1 month                        (5 Points)  [ ] Lupus anticoagulants                                     (3 Points)                                                           [ ] Anticardiolipin antibodies                               (3 Points)                                                       [ ] High homocysteine in the blood                      (3 Points)                                             [ ] Other congenital or acquired thrombophilia      (3 Points)                                                [ ] Heparin induced thrombocytopenia                  (3 Points)                                     Total Score [   5       ]

## 2023-10-05 ENCOUNTER — APPOINTMENT (OUTPATIENT)
Dept: UROLOGY | Facility: CLINIC | Age: 73
End: 2023-10-05
Payer: MEDICARE

## 2023-10-05 VITALS
SYSTOLIC BLOOD PRESSURE: 158 MMHG | WEIGHT: 104 LBS | DIASTOLIC BLOOD PRESSURE: 74 MMHG | HEIGHT: 62 IN | TEMPERATURE: 98 F | RESPIRATION RATE: 16 BRPM | OXYGEN SATURATION: 95 % | BODY MASS INDEX: 19.14 KG/M2 | HEART RATE: 92 BPM

## 2023-10-05 PROCEDURE — 52000 CYSTOURETHROSCOPY: CPT

## 2023-10-06 ENCOUNTER — APPOINTMENT (OUTPATIENT)
Dept: INTERNAL MEDICINE | Facility: CLINIC | Age: 73
End: 2023-10-06
Payer: MEDICARE

## 2023-10-06 VITALS
WEIGHT: 109 LBS | DIASTOLIC BLOOD PRESSURE: 68 MMHG | SYSTOLIC BLOOD PRESSURE: 112 MMHG | OXYGEN SATURATION: 95 % | TEMPERATURE: 98.2 F | HEART RATE: 97 BPM | BODY MASS INDEX: 19.94 KG/M2

## 2023-10-06 DIAGNOSIS — Z97.8 PRESENCE OF OTHER SPECIFIED DEVICES: ICD-10-CM

## 2023-10-06 DIAGNOSIS — Z96.0 PRESENCE OF UROGENITAL IMPLANTS: ICD-10-CM

## 2023-10-06 DIAGNOSIS — N36.42 INTRINSIC SPHINCTER DEFICIENCY (ISD): ICD-10-CM

## 2023-10-06 DIAGNOSIS — N39.3 STRESS INCONTINENCE (FEMALE) (MALE): ICD-10-CM

## 2023-10-06 DIAGNOSIS — G20.A1 PARKINSON'S DISEASE WITHOUT DYSKINESIA, WITHOUT MENTION OF FLUCTUATIONS: ICD-10-CM

## 2023-10-06 DIAGNOSIS — Z87.898 PERSONAL HISTORY OF OTHER SPECIFIED CONDITIONS: ICD-10-CM

## 2023-10-06 DIAGNOSIS — Z01.818 ENCOUNTER FOR OTHER PREPROCEDURAL EXAMINATION: ICD-10-CM

## 2023-10-06 PROCEDURE — 99214 OFFICE O/P EST MOD 30 MIN: CPT

## 2023-10-08 ENCOUNTER — NON-APPOINTMENT (OUTPATIENT)
Age: 73
End: 2023-10-08

## 2023-10-09 DIAGNOSIS — N39.0 URINARY TRACT INFECTION, SITE NOT SPECIFIED: ICD-10-CM

## 2023-10-10 ENCOUNTER — NON-APPOINTMENT (OUTPATIENT)
Age: 73
End: 2023-10-10

## 2023-10-10 ENCOUNTER — TRANSCRIPTION ENCOUNTER (OUTPATIENT)
Age: 73
End: 2023-10-10

## 2023-10-10 LAB — BACTERIA UR CULT: ABNORMAL

## 2023-10-11 ENCOUNTER — APPOINTMENT (OUTPATIENT)
Dept: UROLOGY | Facility: HOSPITAL | Age: 73
End: 2023-10-11

## 2023-10-11 ENCOUNTER — OUTPATIENT (OUTPATIENT)
Dept: INPATIENT UNIT | Facility: HOSPITAL | Age: 73
LOS: 1 days | End: 2023-10-11
Payer: COMMERCIAL

## 2023-10-11 ENCOUNTER — RX RENEWAL (OUTPATIENT)
Age: 73
End: 2023-10-11

## 2023-10-11 ENCOUNTER — APPOINTMENT (OUTPATIENT)
Dept: INTERNAL MEDICINE | Facility: CLINIC | Age: 73
End: 2023-10-11

## 2023-10-11 VITALS
OXYGEN SATURATION: 99 % | HEART RATE: 99 BPM | RESPIRATION RATE: 16 BRPM | TEMPERATURE: 98 F | HEIGHT: 61 IN | SYSTOLIC BLOOD PRESSURE: 144 MMHG | WEIGHT: 110.01 LBS | DIASTOLIC BLOOD PRESSURE: 89 MMHG

## 2023-10-11 DIAGNOSIS — N81.10 CYSTOCELE, UNSPECIFIED: ICD-10-CM

## 2023-10-11 DIAGNOSIS — Z90.710 ACQUIRED ABSENCE OF BOTH CERVIX AND UTERUS: Chronic | ICD-10-CM

## 2023-10-11 RX ORDER — SODIUM CHLORIDE 9 MG/ML
1000 INJECTION, SOLUTION INTRAVENOUS
Refills: 0 | Status: DISCONTINUED | OUTPATIENT
Start: 2023-10-11 | End: 2023-10-25

## 2023-10-11 RX ORDER — SENNA PLUS 8.6 MG/1
2 TABLET ORAL AT BEDTIME
Refills: 0 | Status: DISCONTINUED | OUTPATIENT
Start: 2023-10-11 | End: 2023-10-25

## 2023-10-11 RX ORDER — INFLUENZA VIRUS VACCINE 15; 15; 15; 15 UG/.5ML; UG/.5ML; UG/.5ML; UG/.5ML
0.7 SUSPENSION INTRAMUSCULAR ONCE
Refills: 0 | Status: DISCONTINUED | OUTPATIENT
Start: 2023-10-11 | End: 2023-10-25

## 2023-10-11 RX ORDER — CARBIDOPA AND LEVODOPA 25; 100 MG/1; MG/1
1 TABLET ORAL
Refills: 0 | DISCHARGE

## 2023-10-11 RX ORDER — HEPARIN SODIUM 5000 [USP'U]/ML
5000 INJECTION INTRAVENOUS; SUBCUTANEOUS EVERY 8 HOURS
Refills: 0 | Status: DISCONTINUED | OUTPATIENT
Start: 2023-10-11 | End: 2023-10-25

## 2023-10-11 RX ORDER — ONDANSETRON 8 MG/1
4 TABLET, FILM COATED ORAL ONCE
Refills: 0 | Status: DISCONTINUED | OUTPATIENT
Start: 2023-10-11 | End: 2023-10-11

## 2023-10-11 RX ORDER — ATORVASTATIN CALCIUM 80 MG/1
10 TABLET, FILM COATED ORAL AT BEDTIME
Refills: 0 | Status: DISCONTINUED | OUTPATIENT
Start: 2023-10-11 | End: 2023-10-25

## 2023-10-11 RX ORDER — ACETAMINOPHEN 500 MG
975 TABLET ORAL EVERY 6 HOURS
Refills: 0 | Status: DISCONTINUED | OUTPATIENT
Start: 2023-10-11 | End: 2023-10-25

## 2023-10-11 RX ORDER — CIPROFLOXACIN LACTATE 400MG/40ML
400 VIAL (ML) INTRAVENOUS EVERY 12 HOURS
Refills: 0 | Status: DISCONTINUED | OUTPATIENT
Start: 2023-10-11 | End: 2023-10-25

## 2023-10-11 RX ORDER — OXYCODONE HYDROCHLORIDE 5 MG/1
5 TABLET ORAL EVERY 6 HOURS
Refills: 0 | Status: DISCONTINUED | OUTPATIENT
Start: 2023-10-11 | End: 2023-10-11

## 2023-10-11 RX ORDER — CARBIDOPA AND LEVODOPA 25; 100 MG/1; MG/1
1 TABLET ORAL
Refills: 0 | Status: DISCONTINUED | OUTPATIENT
Start: 2023-10-11 | End: 2023-10-25

## 2023-10-11 RX ORDER — MIRTAZAPINE 45 MG/1
1 TABLET, ORALLY DISINTEGRATING ORAL
Refills: 0 | DISCHARGE

## 2023-10-11 RX ORDER — SERTRALINE 25 MG/1
150 TABLET, FILM COATED ORAL AT BEDTIME
Refills: 0 | Status: DISCONTINUED | OUTPATIENT
Start: 2023-10-11 | End: 2023-10-25

## 2023-10-11 RX ORDER — HYDROMORPHONE HYDROCHLORIDE 2 MG/ML
0.2 INJECTION INTRAMUSCULAR; INTRAVENOUS; SUBCUTANEOUS
Refills: 0 | Status: DISCONTINUED | OUTPATIENT
Start: 2023-10-11 | End: 2023-10-11

## 2023-10-11 RX ORDER — BACLOFEN 100 %
1 POWDER (GRAM) MISCELLANEOUS
Refills: 0 | DISCHARGE

## 2023-10-11 RX ORDER — BACLOFEN 100 %
10 POWDER (GRAM) MISCELLANEOUS EVERY 8 HOURS
Refills: 0 | Status: DISCONTINUED | OUTPATIENT
Start: 2023-10-11 | End: 2023-10-25

## 2023-10-11 RX ORDER — POLYETHYLENE GLYCOL 3350 17 G/17G
17 POWDER, FOR SOLUTION ORAL DAILY
Refills: 0 | Status: DISCONTINUED | OUTPATIENT
Start: 2023-10-11 | End: 2023-10-25

## 2023-10-11 RX ORDER — ESTRADIOL 4 UG/1
1 INSERT VAGINAL
Refills: 0 | DISCHARGE

## 2023-10-11 RX ORDER — ATORVASTATIN CALCIUM 80 MG/1
1 TABLET, FILM COATED ORAL
Refills: 0 | DISCHARGE

## 2023-10-11 RX ORDER — SERTRALINE 25 MG/1
1.5 TABLET, FILM COATED ORAL
Refills: 0 | DISCHARGE

## 2023-10-11 RX ORDER — MIRTAZAPINE 45 MG/1
30 TABLET, ORALLY DISINTEGRATING ORAL AT BEDTIME
Refills: 0 | Status: DISCONTINUED | OUTPATIENT
Start: 2023-10-11 | End: 2023-10-25

## 2023-10-11 RX ADMIN — HEPARIN SODIUM 5000 UNIT(S): 5000 INJECTION INTRAVENOUS; SUBCUTANEOUS at 14:25

## 2023-10-11 RX ADMIN — SERTRALINE 150 MILLIGRAM(S): 25 TABLET, FILM COATED ORAL at 22:30

## 2023-10-11 RX ADMIN — MIRTAZAPINE 30 MILLIGRAM(S): 45 TABLET, ORALLY DISINTEGRATING ORAL at 22:48

## 2023-10-11 RX ADMIN — Medication 975 MILLIGRAM(S): at 15:43

## 2023-10-11 RX ADMIN — CARBIDOPA AND LEVODOPA 1 TABLET(S): 25; 100 TABLET ORAL at 11:47

## 2023-10-11 RX ADMIN — CARBIDOPA AND LEVODOPA 1 TABLET(S): 25; 100 TABLET ORAL at 23:34

## 2023-10-11 RX ADMIN — CARBIDOPA AND LEVODOPA 1 TABLET(S): 25; 100 TABLET ORAL at 17:10

## 2023-10-11 RX ADMIN — SODIUM CHLORIDE 75 MILLILITER(S): 9 INJECTION, SOLUTION INTRAVENOUS at 10:55

## 2023-10-11 RX ADMIN — Medication 975 MILLIGRAM(S): at 22:29

## 2023-10-11 RX ADMIN — POLYETHYLENE GLYCOL 3350 17 GRAM(S): 17 POWDER, FOR SOLUTION ORAL at 11:52

## 2023-10-11 RX ADMIN — Medication 10 MILLIGRAM(S): at 22:32

## 2023-10-11 RX ADMIN — Medication 200 MILLIGRAM(S): at 17:09

## 2023-10-11 RX ADMIN — ATORVASTATIN CALCIUM 10 MILLIGRAM(S): 80 TABLET, FILM COATED ORAL at 22:30

## 2023-10-11 RX ADMIN — Medication 10 MILLIGRAM(S): at 14:23

## 2023-10-11 RX ADMIN — HEPARIN SODIUM 5000 UNIT(S): 5000 INJECTION INTRAVENOUS; SUBCUTANEOUS at 22:25

## 2023-10-11 RX ADMIN — SENNA PLUS 2 TABLET(S): 8.6 TABLET ORAL at 22:30

## 2023-10-11 NOTE — PROGRESS NOTE ADULT - SUBJECTIVE AND OBJECTIVE BOX
Post op Check    Pt seen and examined without complaints. Pain is controlled. Denies SOB/CP/N/V.     Vital Signs Last 24 Hrs  T(C): 36.8 (11 Oct 2023 15:00), Max: 37.3 (11 Oct 2023 10:21)  T(F): 98.2 (11 Oct 2023 15:00), Max: 99.1 (11 Oct 2023 10:21)  HR: 100 (11 Oct 2023 15:00) (78 - 100)  BP: 108/69 (11 Oct 2023 15:00) (106/59 - 144/89)  BP(mean): 82 (11 Oct 2023 15:00) (75 - 107)  RR: 14 (11 Oct 2023 15:00) (12 - 16)  SpO2: 99% (11 Oct 2023 15:00) (94% - 99%)    Parameters below as of 11 Oct 2023 11:00  Patient On (Oxygen Delivery Method): room air        I&O's Summary    11 Oct 2023 07:01  -  11 Oct 2023 15:32  --------------------------------------------------------  IN: 573 mL / OUT: 575 mL / NET: -2 mL        Physical Exam  Gen: NAD, A&Ox3  Pulm: No respiratory distress, no subcostal retractions  CV: RRR, no JVD  Abd: Soft, NT, ND  Back:   :                 A/P: 73y Female s/p   DVT prophylaxis/OOB  Incentive spirometry  Strict I&O's  Analgesia and antiemetics as needed  Diet  AM labs    Post op Check    Pt seen and examined without complaints. Pain is controlled. Denies SOB/CP/N/V.     Vital Signs Last 24 Hrs  T(C): 36.8 (11 Oct 2023 15:00), Max: 37.3 (11 Oct 2023 10:21)  T(F): 98.2 (11 Oct 2023 15:00), Max: 99.1 (11 Oct 2023 10:21)  HR: 100 (11 Oct 2023 15:00) (78 - 100)  BP: 108/69 (11 Oct 2023 15:00) (106/59 - 144/89)  BP(mean): 82 (11 Oct 2023 15:00) (75 - 107)  RR: 14 (11 Oct 2023 15:00) (12 - 16)  SpO2: 99% (11 Oct 2023 15:00) (94% - 99%)    Parameters below as of 11 Oct 2023 11:00  Patient On (Oxygen Delivery Method): room air    I&O's Summary    11 Oct 2023 07:01  -  11 Oct 2023 15:32  --------------------------------------------------------  IN: 573 mL / OUT: 575 mL / NET: -2 mL    Physical Exam  Gen: NAD, A&Ox3  Pulm: No respiratory distress, no subcostal retractions  CV: RRR, no JVD  Abd: Soft, NT, ND  Back: no cvat   : Mallory draining clear yellow urine

## 2023-10-11 NOTE — PATIENT PROFILE ADULT - FALL HARM RISK - RISK INTERVENTIONS

## 2023-10-12 ENCOUNTER — TRANSCRIPTION ENCOUNTER (OUTPATIENT)
Age: 73
End: 2023-10-12

## 2023-10-12 VITALS
TEMPERATURE: 98 F | DIASTOLIC BLOOD PRESSURE: 66 MMHG | SYSTOLIC BLOOD PRESSURE: 113 MMHG | HEART RATE: 90 BPM | RESPIRATION RATE: 18 BRPM | OXYGEN SATURATION: 94 %

## 2023-10-12 PROCEDURE — 57260 CMBN ANT PST COLPRHY: CPT

## 2023-10-12 PROCEDURE — C9399: CPT

## 2023-10-12 PROCEDURE — 57110 VAGNC COMPL RMVL VAG WALL: CPT

## 2023-10-12 RX ORDER — DOCUSATE SODIUM 100 MG
1 CAPSULE ORAL
Qty: 0 | Refills: 0 | DISCHARGE

## 2023-10-12 RX ORDER — SENNA PLUS 8.6 MG/1
2 TABLET ORAL
Qty: 0 | Refills: 0 | DISCHARGE
Start: 2023-10-12

## 2023-10-12 RX ORDER — OXYCODONE AND ACETAMINOPHEN 5; 325 MG/1; MG/1
1 TABLET ORAL
Qty: 12 | Refills: 0
Start: 2023-10-12

## 2023-10-12 RX ADMIN — Medication 200 MILLIGRAM(S): at 05:54

## 2023-10-12 RX ADMIN — POLYETHYLENE GLYCOL 3350 17 GRAM(S): 17 POWDER, FOR SOLUTION ORAL at 12:14

## 2023-10-12 RX ADMIN — CARBIDOPA AND LEVODOPA 1 TABLET(S): 25; 100 TABLET ORAL at 12:12

## 2023-10-12 RX ADMIN — SODIUM CHLORIDE 75 MILLILITER(S): 9 INJECTION, SOLUTION INTRAVENOUS at 05:55

## 2023-10-12 RX ADMIN — Medication 975 MILLIGRAM(S): at 03:20

## 2023-10-12 RX ADMIN — Medication 975 MILLIGRAM(S): at 09:22

## 2023-10-12 RX ADMIN — Medication 10 MILLIGRAM(S): at 05:54

## 2023-10-12 RX ADMIN — HEPARIN SODIUM 5000 UNIT(S): 5000 INJECTION INTRAVENOUS; SUBCUTANEOUS at 05:55

## 2023-10-12 RX ADMIN — Medication 975 MILLIGRAM(S): at 10:30

## 2023-10-12 RX ADMIN — CARBIDOPA AND LEVODOPA 1 TABLET(S): 25; 100 TABLET ORAL at 05:54

## 2023-10-12 NOTE — DISCHARGE NOTE PROVIDER - NSDCCPCAREPLAN_GEN_ALL_CORE_FT
PRINCIPAL DISCHARGE DIAGNOSIS  Diagnosis: Vaginal prolapse  Assessment and Plan of Treatment: you had a colpocleisis  Call the office if you experience fever, chills, uncontrolled pain, the inability to tolerate liquids, or the urine does not flow   to promote wound healing do not take a bath, take stool softeners to avoid constipation,  continue to walk frequently, return to daily living activities slowly, no heavy lifting greater than 10lbs for 4-6 weeks, follow up Dr Edgar in two weeks for your post operative appointment      SECONDARY DISCHARGE DIAGNOSES  Diagnosis: Anxiety and depression  Assessment and Plan of Treatment: continue home medication    Diagnosis: Parkinsons disease  Assessment and Plan of Treatment: continue home medication

## 2023-10-12 NOTE — DISCHARGE NOTE PROVIDER - CARE PROVIDER_API CALL
Regis Edgar  Urology  [address]  [place] [address]  Phone: [phone]  Fax: [phone]  Follow Up Time:

## 2023-10-12 NOTE — PROGRESS NOTE ADULT - ASSESSMENT
A/P: 73y Female s/p Colpocleisis 10/11/23  DVT prophylaxis/OOB  Incentive spirometry  Strict I&O's  Analgesia and antiemetics as needed  Diet-regular  AM TOV underway  will obtain post void residual   anticipate dc home later today 
A/P: 73y Female s/p Colpocleisis  DVT prophylaxis/OOB  Incentive spirometry  Strict I&O's  Analgesia and antiemetics as needed  Diet-regular  AM TOV

## 2023-10-12 NOTE — DISCHARGE NOTE PROVIDER - HOSPITAL COURSE
Pt is a 74 yo f with a pmh of parkinsons, OP, HLD, HTN, asthma anxiety and depression with pelvic organ prolapse who arrived at [facility] on 10/11/23   Please see operative report  for further details.  post op she did well. her vitals were stable, she tolerated diet, her pain was controlled, she ambulated  and her labs were stable.  she underwent succesful tov and was deemed stable for dc home Pt is a 74 yo f with a pmh of parkinsons, OP, HLD, HTN, asthma anxiety and depression with pelvic organ prolapse who arrived at [facility] on 10/11/23   Please see operative report  for further details.  post op she did well. her vitals were stable, she tolerated diet, her pain was controlled, she ambulated  and her labs were stable.  she underwent successful tov and was deemed stable for dc home

## 2023-10-12 NOTE — DISCHARGE NOTE NURSING/CASE MANAGEMENT/SOCIAL WORK - NSDCVIVACCINE_GEN_ALL_CORE_FT
Tdap; 28-Jan-2016 03:33; Catia Berger ([place]); [employer]; [id number]; IntraMuscular; Deltoid Left.; 0.5 milliLiter(s); VIS (VIS Published: 09-May-2013, VIS Presented: 28-Jan-2016);

## 2023-10-12 NOTE — DISCHARGE NOTE NURSING/CASE MANAGEMENT/SOCIAL WORK - PATIENT PORTAL LINK FT
You can access the FollowMyHealth Patient Portal offered by [facility] by registering at the following website: [email or web address]. By joining [employer]’s FollowMyHealth portal, you will also be able to view your health information using other applications (apps) compatible with our system.

## 2023-10-12 NOTE — DISCHARGE NOTE PROVIDER - NSDCFUSCHEDAPPT_GEN_ALL_CORE_FT
Regis Edgar Physician [facility]  UROLOGY [address] [place] [address]  Scheduled Appointment: 11/30/2023

## 2023-10-12 NOTE — DISCHARGE NOTE PROVIDER - NSDCMRMEDTOKEN_GEN_ALL_CORE_FT
atorvastatin 10 mg oral tablet: 1 tab(s) orally once a day (at bedtime)  baclofen 10 mg oral tablet: 1 tab(s) orally 3 times a day  betamethasone dipropionate 0.05% topical cream: Apply topically to affected area 2 times a day vagina  calcium + D3 600-200 mg once daily:   carbidopa-levodopa 25 mg-250 mg oral tablet: 1 tab(s) orally 4 times a day  Colace 100 mg oral capsule: 1 cap(s) orally 3 times a day  estradiol 0.1 mg/g vaginal cream: 1 dose(s) intravaginally three times weekly  mirtazapine 30 mg oral tablet: 1 tab(s) orally once a day (at bedtime)  senna leaf extract oral tablet: 2 tab(s) orally once a day (at bedtime)  sertraline 100 mg oral tablet: 1.5 tab(s) orally once a day (at bedtime)   atorvastatin 10 mg oral tablet: 1 tab(s) orally once a day (at bedtime)  baclofen 10 mg oral tablet: 1 tab(s) orally 3 times a day  betamethasone dipropionate 0.05% topical cream: Apply topically to affected area 2 times a day vagina  calcium + D3 600-200 mg once daily:   carbidopa-levodopa 25 mg-250 mg oral tablet: 1 tab(s) orally 4 times a day  Colace 100 mg oral capsule: 1 cap(s) orally 3 times a day  estradiol 0.1 mg/g vaginal cream: 1 dose(s) intravaginally three times weekly  mirtazapine 30 mg oral tablet: 1 tab(s) orally once a day (at bedtime)  Percocet 5 mg-325 mg oral tablet: 1 tab(s) orally every 6 hours as needed for  severe pain MDD: 4 tablets  senna leaf extract oral tablet: 2 tab(s) orally once a day (at bedtime)  sertraline 100 mg oral tablet: 1.5 tab(s) orally once a day (at bedtime)

## 2023-10-12 NOTE — PROGRESS NOTE ADULT - SUBJECTIVE AND OBJECTIVE BOX
Subjective  feeling well without overnight events   Objective    Vital signs  T(F): , Max: 99.1 (10-11-23 @ 10:21)  HR: 88 (10-12-23 @ 05:49)  BP: 131/79 (10-12-23 @ 05:49)  SpO2: 95% (10-12-23 @ 05:49)  Wt(kg): --    Output     10-11 @ 07:01  -  10-12 @ 07:00  --------------------------------------------------------  IN: 1318 mL / OUT: 1390 mL / NET: -72 mL        Gen awake alert nad axox3  Abd flat soft ntnd      rod in place urine yellow

## 2023-11-30 ENCOUNTER — APPOINTMENT (OUTPATIENT)
Dept: UROLOGY | Facility: CLINIC | Age: 73
End: 2023-11-30
Payer: MEDICARE

## 2023-11-30 VITALS
OXYGEN SATURATION: 98 % | DIASTOLIC BLOOD PRESSURE: 69 MMHG | SYSTOLIC BLOOD PRESSURE: 128 MMHG | RESPIRATION RATE: 16 BRPM | HEART RATE: 95 BPM | TEMPERATURE: 98.2 F

## 2023-11-30 DIAGNOSIS — R33.9 RETENTION OF URINE, UNSPECIFIED: ICD-10-CM

## 2023-11-30 PROBLEM — Z87.898 PERSONAL HISTORY OF OTHER SPECIFIED CONDITIONS: Chronic | Status: ACTIVE | Noted: 2023-09-25

## 2023-11-30 PROBLEM — Z87.39 PERSONAL HISTORY OF OTHER DISEASES OF THE MUSCULOSKELETAL SYSTEM AND CONNECTIVE TISSUE: Chronic | Status: ACTIVE | Noted: 2023-09-25

## 2023-11-30 PROBLEM — F41.9 ANXIETY DISORDER, UNSPECIFIED: Chronic | Status: ACTIVE | Noted: 2023-09-25

## 2023-11-30 PROBLEM — N39.3 STRESS INCONTINENCE (FEMALE) (MALE): Chronic | Status: ACTIVE | Noted: 2023-09-25

## 2023-11-30 PROBLEM — J45.909 UNSPECIFIED ASTHMA, UNCOMPLICATED: Chronic | Status: ACTIVE | Noted: 2023-09-25

## 2023-11-30 PROBLEM — I10 ESSENTIAL (PRIMARY) HYPERTENSION: Chronic | Status: ACTIVE | Noted: 2023-09-25

## 2023-11-30 PROBLEM — G20 PARKINSON'S DISEASE: Chronic | Status: ACTIVE | Noted: 2023-09-25

## 2023-11-30 PROBLEM — N36.8 OTHER SPECIFIED DISORDERS OF URETHRA: Chronic | Status: ACTIVE | Noted: 2023-09-25

## 2023-11-30 PROBLEM — E78.5 HYPERLIPIDEMIA, UNSPECIFIED: Chronic | Status: ACTIVE | Noted: 2023-09-25

## 2023-11-30 PROCEDURE — 99213 OFFICE O/P EST LOW 20 MIN: CPT | Mod: 24

## 2023-12-20 RX ORDER — ERGOCALCIFEROL 1.25 MG/1
1 CAPSULE ORAL
Qty: 0 | Refills: 0 | DISCHARGE

## 2023-12-20 RX ORDER — MEGESTROL ACETATE 40 MG/ML
1 SUSPENSION ORAL
Qty: 0 | Refills: 0 | DISCHARGE

## 2023-12-20 RX ORDER — SERTRALINE 25 MG/1
1 TABLET, FILM COATED ORAL
Qty: 0 | Refills: 0 | DISCHARGE

## 2023-12-20 RX ORDER — CARBIDOPA AND LEVODOPA 25; 100 MG/1; MG/1
1 TABLET ORAL
Qty: 0 | Refills: 0 | DISCHARGE

## 2023-12-20 RX ORDER — ATORVASTATIN CALCIUM 80 MG/1
1 TABLET, FILM COATED ORAL
Qty: 0 | Refills: 0 | DISCHARGE

## 2023-12-20 RX ORDER — BACLOFEN 100 %
1 POWDER (GRAM) MISCELLANEOUS
Qty: 0 | Refills: 0 | DISCHARGE

## 2023-12-20 RX ORDER — MIRTAZAPINE 45 MG/1
1 TABLET, ORALLY DISINTEGRATING ORAL
Qty: 0 | Refills: 0 | DISCHARGE

## 2024-01-24 ENCOUNTER — APPOINTMENT (OUTPATIENT)
Dept: NEUROLOGY | Facility: CLINIC | Age: 74
End: 2024-01-24
Payer: MEDICARE

## 2024-01-24 VITALS
TEMPERATURE: 98.2 F | HEART RATE: 91 BPM | SYSTOLIC BLOOD PRESSURE: 100 MMHG | DIASTOLIC BLOOD PRESSURE: 60 MMHG | HEIGHT: 62 IN | BODY MASS INDEX: 20.24 KG/M2 | WEIGHT: 110 LBS

## 2024-01-24 PROBLEM — E78.5 HYPERLIPIDEMIA, UNSPECIFIED: Chronic | Status: ACTIVE | Noted: 2023-08-20

## 2024-01-24 PROBLEM — F41.9 ANXIETY DISORDER, UNSPECIFIED: Chronic | Status: ACTIVE | Noted: 2023-08-20

## 2024-01-24 PROBLEM — G20 PARKINSON'S DISEASE: Chronic | Status: ACTIVE | Noted: 2023-08-20

## 2024-01-24 PROBLEM — Z87.448 PERSONAL HISTORY OF OTHER DISEASES OF URINARY SYSTEM: Chronic | Status: ACTIVE | Noted: 2023-08-20

## 2024-01-24 PROCEDURE — 99214 OFFICE O/P EST MOD 30 MIN: CPT

## 2024-01-24 PROCEDURE — G2211 COMPLEX E/M VISIT ADD ON: CPT

## 2024-01-24 RX ORDER — BETAMETHASONE DIPROPIONATE 0.5 MG/G
0.05 CREAM TOPICAL TWICE DAILY
Qty: 60 | Refills: 1 | Status: DISCONTINUED | COMMUNITY
Start: 2022-09-13 | End: 2024-01-24

## 2024-01-24 RX ORDER — CIPROFLOXACIN HYDROCHLORIDE 500 MG/1
500 TABLET, FILM COATED ORAL
Qty: 6 | Refills: 0 | Status: DISCONTINUED | COMMUNITY
Start: 2023-10-09 | End: 2024-01-24

## 2024-01-24 RX ORDER — SULFAMETHOXAZOLE AND TRIMETHOPRIM 800; 160 MG/1; MG/1
800-160 TABLET ORAL
Qty: 6 | Refills: 0 | Status: DISCONTINUED | COMMUNITY
Start: 2023-10-09 | End: 2024-01-24

## 2024-01-24 RX ORDER — VALACYCLOVIR 1 G/1
1 TABLET, FILM COATED ORAL
Qty: 90 | Refills: 0 | Status: DISCONTINUED | COMMUNITY
Start: 2022-09-13 | End: 2024-01-24

## 2024-01-24 RX ORDER — IBANDRONATE SODIUM 150 MG/1
150 TABLET, FILM COATED ORAL
Refills: 0 | Status: DISCONTINUED | COMMUNITY
End: 2024-01-24

## 2024-01-24 RX ORDER — MEGESTROL ACETATE 40 MG/ML
40 SUSPENSION ORAL TWICE DAILY
Qty: 600 | Refills: 0 | Status: DISCONTINUED | COMMUNITY
Start: 2023-05-10 | End: 2024-01-24

## 2024-01-24 NOTE — HISTORY OF PRESENT ILLNESS
[FreeTextEntry1] : 73-year-old woman history of Parkinson disease here accompanied by her , for follow-up visit.  Last seen February 2022. Reports doing about the same continues on Sinemet 25/254 times a day.  Well-tolerated, no hallucinations, no excessive daytime fatigue.  She does report dyskinesias, throughout the day, in the morning she when she wakes up she is stiff she is slow when she takes the medications the effects are gone but then the extra movements come on.  Not painful she does not complain about them. No falls no injuries. At home very sedentary, does some chores around the house, do the laundry, make the bed, is independent for herself.  At times forgetful.  reports she goes to bed very late, usually 3 to 4:00 in the morning, will wake up at 10 and 12:00 in the afternoon.  When asked why she said she prefers it that way.  She takes trazodone to help her sleep.  Does not take naps during the day.

## 2024-01-24 NOTE — REVIEW OF SYSTEMS
[As Noted in HPI] : as noted in HPI [Memory Lapses or Loss] : memory loss [Repeating Questions] : repeated questioning about recent events [Sleep Disturbances] : sleep disturbances [Anxiety] : anxiety [Negative] : Heme/Lymph

## 2024-01-24 NOTE — PHYSICAL EXAM
[General Appearance - Alert] : alert [General Appearance - In No Acute Distress] : in no acute distress [Person] : oriented to person [Place] : oriented to place [Time] : oriented to time [Span Intact] : the attention span was normal [Concentration Intact] : normal concentrating ability [Naming Objects] : no difficulty naming common objects [Repeating Phrases] : no difficulty repeating a phrase [Fluency] : fluency intact [Cranial Nerves Optic (II)] : visual acuity intact bilaterally,  visual fields full to confrontation, pupils equal round and reactive to light [Cranial Nerves Oculomotor (III)] : extraocular motion intact [Cranial Nerves Trigeminal (V)] : facial sensation intact symmetrically [Cranial Nerves Facial (VII)] : face symmetrical [Cranial Nerves Vestibulocochlear (VIII)] : hearing was intact bilaterally [Cranial Nerves Glossopharyngeal (IX)] : tongue and palate midline [Cranial Nerves Accessory (XI - Cranial And Spinal)] : head turning and shoulder shrug symmetric [Cranial Nerves Hypoglossal (XII)] : there was no tongue deviation with protrusion [Motor Tone] : muscle tone was normal in all four extremities [Motor Strength] : muscle strength was normal in all four extremities [No Muscle Atrophy] : normal bulk in all four extremities [Motor Handedness Right-Handed] : the patient is right hand dominant [Paresis Pronator Drift Right-Sided] : no pronator drift on the right [Paresis Pronator Drift Left-Sided] : no pronator drift on the left [Sensation Tactile Decrease] : light touch was intact [Abnormal Walk] : normal gait [Balance] : balance was intact [Past-pointing] : there was no past-pointing [Tremor] : a tremor present [Dysdiadochokinesia Bilaterally] : not present [Coordination - Dysmetria Impaired Finger-to-Nose Bilateral] : not present [1+] : Patella left 1+ [0] : Ankle jerk left 0 [FreeTextEntry6] : Dyskinesias noted with movements of the torso mild.

## 2024-01-24 NOTE — DISCUSSION/SUMMARY
[FreeTextEntry1] : 73-year-old woman with a history of Parkinson disease, drug-induced dyskinesias.  Presently on Sinemet 25/254 times a day. Reviewed and discussed schedule as she is using. Discussed the possibility of dropping the total amount 25/101 or 1-1/2 tablet every 3 hours as opposed to the 250 mg of dopamine 34 but she prefers to stay on the present dose.  Reports that movement does not bother her. Discussed other options, she would be interested in hearing about the deep brain stimulation for Parkinson.  Will place referral for neurosurgery. Discussed the importance of exercise, keep herself physically and emotionally engaged with friends and family.  Recommended cognitive test testing.  But she prefers to wait. Return to office, 6 months.

## 2024-02-07 ENCOUNTER — APPOINTMENT (OUTPATIENT)
Dept: NEUROLOGY | Facility: CLINIC | Age: 74
End: 2024-02-07
Payer: MEDICARE

## 2024-02-07 PROCEDURE — G2211 COMPLEX E/M VISIT ADD ON: CPT

## 2024-02-07 PROCEDURE — 99214 OFFICE O/P EST MOD 30 MIN: CPT | Mod: 95

## 2024-02-07 NOTE — DISCUSSION/SUMMARY
[FreeTextEntry1] : 73 yr old woman hx of Parkinson dx, reviewed with patient and family. Reviewed options, medications was referred to neurosurgeon for evaluation f DBS.

## 2024-02-07 NOTE — REASON FOR VISIT
[Home] : at home, [unfilled] , at the time of the visit. [Medical Office: ([facility])___] : at the medical office located in  [Spouse] : spouse [Family Member] : family member [Patient] : the patient [Follow-Up: _____] : a [unfilled] follow-up visit

## 2024-02-07 NOTE — REVIEW OF SYSTEMS
[As Noted in HPI] : as noted in HPI [Memory Lapses or Loss] : memory loss [Difficulty Walking] : difficulty walking [Negative] : Heme/Lymph

## 2024-02-07 NOTE — HISTORY OF PRESENT ILLNESS
[FreeTextEntry1] : 73 yr old woman hx of Parkinson, with dyskinesias, he son wans to address options, review medications.

## 2024-02-07 NOTE — DATA REVIEWED
[de-identified] : 	 EXAM:  NM BRAIN SPECT SA [facility]   PROCEDURE DATE:  08/05/2021    INTERPRETATION:  RADIOPHARMACEUTICAL: 4.3 mCi I-123 Ioflupane, i.v.  CLINICAL INFORMATION: 71-year-old female with movement disorder, referred to evaluate for essential tremor versus Parkinson's disease.  The patient is not taking any confounding medications at the time of the test.  TECHNIQUE:  The patient was not pretreated with Lugol's solution.  SPECT imaging of the brain was performed approximately 3 hours after radiopharmaceutical injection.  COMPARISON:  No prior DaTSCAN was available for comparison.  FINDINGS: Moderately decreased radiopharmaceutical uptake in the right caudate nucleus. Moderately decreased radiopharmaceutical uptake in the left caudate nucleus. Markedly decreased radiopharmaceutical uptake in the right putamen. Markedly decreased radiopharmaceutical uptake in the left putamen.  IMPRESSION:  Abnormal DaTScan. Findings support the diagnosis of Parkinson's disease or a Parkinsonian Syndrome.

## 2024-02-08 ENCOUNTER — RX RENEWAL (OUTPATIENT)
Age: 74
End: 2024-02-08

## 2024-02-08 RX ORDER — CARBIDOPA AND LEVODOPA 10; 100 MG/1; MG/1
10-100 TABLET ORAL
Qty: 744 | Refills: 0 | Status: ACTIVE | COMMUNITY
Start: 2024-02-07 | End: 1900-01-01

## 2024-04-01 RX ORDER — MIRTAZAPINE 30 MG/1
30 TABLET, FILM COATED ORAL
Qty: 90 | Refills: 0 | Status: ACTIVE | COMMUNITY
Start: 2022-04-06 | End: 1900-01-01

## 2024-04-04 ENCOUNTER — RX RENEWAL (OUTPATIENT)
Age: 74
End: 2024-04-04

## 2024-04-08 RX ORDER — SERTRALINE HYDROCHLORIDE 100 MG/1
100 TABLET, FILM COATED ORAL
Qty: 135 | Refills: 0 | Status: ACTIVE | COMMUNITY
Start: 2022-05-04 | End: 1900-01-01

## 2024-04-12 ENCOUNTER — RX RENEWAL (OUTPATIENT)
Age: 74
End: 2024-04-12

## 2024-04-12 RX ORDER — ENTACAPONE 200 MG/1
200 TABLET, FILM COATED ORAL
Qty: 450 | Refills: 0 | Status: ACTIVE | COMMUNITY
Start: 2024-04-12 | End: 1900-01-01

## 2024-04-12 RX ORDER — CARBIDOPA AND LEVODOPA 25; 250 MG/1; MG/1
25-250 TABLET ORAL
Qty: 360 | Refills: 0 | Status: DISCONTINUED | COMMUNITY
Start: 2022-02-18 | End: 2024-04-12

## 2024-04-28 RX ORDER — ATORVASTATIN CALCIUM 10 MG/1
10 TABLET, FILM COATED ORAL
Qty: 90 | Refills: 0 | Status: ACTIVE | COMMUNITY
Start: 2021-06-09 | End: 1900-01-01

## 2024-05-28 RX ORDER — BACLOFEN 10 MG/1
10 TABLET ORAL 3 TIMES DAILY
Qty: 90 | Refills: 0 | Status: ACTIVE | COMMUNITY
Start: 2023-05-10 | End: 1900-01-01

## 2024-06-13 ENCOUNTER — APPOINTMENT (OUTPATIENT)
Dept: UROLOGY | Facility: CLINIC | Age: 74
End: 2024-06-13
Payer: MEDICARE

## 2024-06-13 VITALS
SYSTOLIC BLOOD PRESSURE: 111 MMHG | TEMPERATURE: 98 F | OXYGEN SATURATION: 95 % | HEART RATE: 81 BPM | DIASTOLIC BLOOD PRESSURE: 64 MMHG

## 2024-06-13 DIAGNOSIS — N36.8 OTHER SPECIFIED DISORDERS OF URETHRA: ICD-10-CM

## 2024-06-13 DIAGNOSIS — N81.10 CYSTOCELE, UNSPECIFIED: ICD-10-CM

## 2024-06-13 PROCEDURE — 99213 OFFICE O/P EST LOW 20 MIN: CPT

## 2024-06-13 PROCEDURE — 99459 PELVIC EXAMINATION: CPT

## 2024-06-13 PROCEDURE — G2211 COMPLEX E/M VISIT ADD ON: CPT

## 2024-06-13 NOTE — ASSESSMENT
[FreeTextEntry1] :  Ms. COELHO has seen me today for postoperative evaluation  She is 8 months s/p colpocleisis for stage IV prolapse, urinary retention  Postoperative course has been uncomplicated.  Feels well. Denies any prolapse or voiding complaints  My recommendations are:        -RTC 1 year

## 2024-06-13 NOTE — PHYSICAL EXAM
[General Appearance - Well Developed] : well developed [General Appearance - Well Nourished] : well nourished [Abdomen Soft] : soft [Abdomen Tenderness] : non-tender [Costovertebral Angle Tenderness] : no ~M costovertebral angle tenderness [FreeTextEntry1] : Chaperone: ODALIS Carvajal  [de-identified] : Incision well-healed, Urethral prolapse is stable however appears less congested and edematous show

## 2024-06-13 NOTE — HISTORY OF PRESENT ILLNESS
[FreeTextEntry1] : 73F presents for postoperative evaluation   h/o stage IV prolapse, urinary retention, urethral prolapse s/p total vaginectomy, colpocleisis (10/11/23)  Patient feels well without prolapse or urinary symptoms Denies any spotting, difficulty urinating, change in bowel habits, or constipation

## 2024-07-09 ENCOUNTER — RX RENEWAL (OUTPATIENT)
Age: 74
End: 2024-07-09

## 2024-07-09 NOTE — DISCHARGE NOTE PROVIDER - ATTENDING DISCHARGE PHYSICAL EXAMINATION:
no
Vital Signs Last 24 Hrs  T(C): 36.8 (22 Aug 2023 05:10), Max: 37 (21 Aug 2023 20:55)  T(F): 98.2 (22 Aug 2023 05:10), Max: 98.6 (21 Aug 2023 20:55)  HR: 89 (22 Aug 2023 05:10) (85 - 94)  BP: 150/75 (22 Aug 2023 05:10) (113/64 - 150/75)  BP(mean): --  RR: 17 (22 Aug 2023 05:10) (16 - 17)  SpO2: 96% (22 Aug 2023 05:10) (94% - 96%)    Parameters below as of 22 Aug 2023 05:10  Patient On (Oxygen Delivery Method): room air        PHYSICAL EXAM:  GENERAL: patient appears well, no acute distress  ENMT: oropharynx clear without erythema, moist mucous membranes  LUNGS: good air entry bilaterally, clear to auscultation, symmetric breath sounds, no wheezing or rhonchi appreciated  HEART: soft S1/S2, regular rate and rhythm, no murmurs noted, no noted edema to b/l LE  GASTROINTESTINAL: abdomen is soft, nontender, nondistended, hypoactive bowel sounds, no palpable masses  : clear urine in bag  MUSCULOSKELETAL: no clubbing or cyanosis, no obvious deformity  NEUROLOGIC: awake, alert, readily interactive, good muscle tone in 4 extremities

## 2024-07-25 ENCOUNTER — APPOINTMENT (OUTPATIENT)
Dept: NEUROLOGY | Facility: CLINIC | Age: 74
End: 2024-07-25
Payer: MEDICARE

## 2024-07-25 VITALS
WEIGHT: 110 LBS | HEART RATE: 80 BPM | DIASTOLIC BLOOD PRESSURE: 68 MMHG | BODY MASS INDEX: 20.24 KG/M2 | HEIGHT: 62 IN | SYSTOLIC BLOOD PRESSURE: 114 MMHG | TEMPERATURE: 98.2 F | OXYGEN SATURATION: 97 %

## 2024-07-25 DIAGNOSIS — M54.9 DORSALGIA, UNSPECIFIED: ICD-10-CM

## 2024-07-25 DIAGNOSIS — G89.29 DORSALGIA, UNSPECIFIED: ICD-10-CM

## 2024-07-25 PROCEDURE — 99214 OFFICE O/P EST MOD 30 MIN: CPT

## 2024-07-25 NOTE — DISCUSSION/SUMMARY
[FreeTextEntry1] : 73-year-old woman with a history of Parkinson's disease, moderate, finds the effect of Sinemet helpful, well-tolerated.  Does note the effects do not last as long as in the past. Complaining of chronic back pain,.  More musculoskeletal. Plan: Will continue Sinemet 10/100, 5 times a day, with entacapone 200 mg p.o. Will add Azilect 0.5 mg p.o. nightly. History of chronic back pain, likely degenerative. Plan: Will order x-rays of the lumbar spine. Will refer to physical therapy. Encouraged regular physical activity, regular exercise. Follow-up in 4 to 5 months.

## 2024-07-25 NOTE — REVIEW OF SYSTEMS
[Difficulty Walking] : difficulty walking [Frequent Falls] : frequent falls [As Noted in HPI] : as noted in HPI [Anxiety] : anxiety [Joint Pain] : joint pain [Joint Stiffness] : joint stiffness [Negative] : Heme/Lymph

## 2024-07-25 NOTE — HISTORY OF PRESENT ILLNESS
[FreeTextEntry1] : 73-year-old right-handed woman history of Parkinson disease companied by  for follow-up visit.  Last seen in February of this year, reports overall doing pretty well, on Sinemet 10/100, 5 x times a day, well-tolerated.  Reports less extraneous movements, less twitching, no nausea, no daytime fatigue, no mood changes, no compulsive thinking.  Finds the effect will sometimes wane before her next dose. Still some problems walking, complains of chronic back pain, across the lower back nonradiating to the legs.  No weakness of the leg, no change in bowel bladder habits.  Has fallen a couple times, she ascribes it to a little Ms. and misjudging distances, placing her feet in the wrong place.  Sometimes trouble clearing the floor with her foot.  No significant injury related to the falls. No head strike, no loss of consciousness. Reports her balance is not very good, she is very inactive, spends a lot of time sitting.  Her  tries to encourage her to walk but she does not get out of the house or at times.  Synovitis associated with fear of injury.  She has a history of anxiety, being treated by her primary care physician. She takes baclofen for back pain, sertraline for anxiety. Occasionally forgetful but otherwise is denies any worsening memory.  No episode of confusion, no delusions no hallucinations.  Usually sleeps well.  No vivid dreams, no aggressive behavior.

## 2024-07-25 NOTE — DATA REVIEWED
[de-identified] : 	  NM Cerebral Perfusion SPECT             Final  No Documents Attached    	 EXAM:  NM BRAIN SPECT [facility]   PROCEDURE DATE:  08/05/2021    INTERPRETATION:  RADIOPHARMACEUTICAL: 4.3 mCi I-123 Ioflupane, i.v.  CLINICAL INFORMATION: 71-year-old female with movement disorder, referred to evaluate for essential tremor versus Parkinson's disease.  The patient is not taking any confounding medications at the time of the test.  TECHNIQUE:  The patient was not pretreated with Lugol's solution.  SPECT imaging of the brain was performed approximately 3 hours after radiopharmaceutical injection.  COMPARISON:  No prior DaTSCAN was available for comparison.  FINDINGS: Moderately decreased radiopharmaceutical uptake in the right caudate nucleus. Moderately decreased radiopharmaceutical uptake in the left caudate nucleus. Markedly decreased radiopharmaceutical uptake in the right putamen. Markedly decreased radiopharmaceutical uptake in the left putamen.  IMPRESSION:  Abnormal DaTScan. Findings support the diagnosis of Parkinson's disease or a Parkinsonian Syndrome.      ACC: [id number]     EXAM:  CT BRAIN   ORDERED BY:  ZAHIRA CHAPA  PROCEDURE DATE:  08/20/2023    INTERPRETATION:  CLINICAL INDICATIONS:  s/p fall prior to admission  COMPARISON: None.  TECHNIQUE: Noncontrast CT of the head. Multiplanar reformations are submitted.  FINDINGS: There is periventricular and subcortical white matter hypodensity without mass effect, nonspecific, likely representing mild chronic microvascular ischemic changes. There is no compelling evidence for an acute transcortical infarction. There is no evidence of mass, mass effect, midline shift or extra-axial fluid collection. The lateral ventricles and cortical sulci are age-appropriate in size and configuration. The orbits, mastoid air cells and visualized paranasal sinuses are unremarkable. The calvarium is intact. Consider MRI as clinically warranted.  IMPRESSION:  Mild chronic microvascular changes without evidence of an acute transcortical infarction or hemorrhage.  --- End of Report ---      JONG KIM MD; Attending Radiologist This document has been electronically signed. Aug 20 2023  4:05PM

## 2024-07-25 NOTE — PHYSICAL EXAM
[General Appearance - Alert] : alert [General Appearance - In No Acute Distress] : in no acute distress [Oriented To Time, Place, And Person] : oriented to person, place, and time [Impaired Insight] : insight and judgment were intact [Affect] : the affect was normal [Person] : oriented to person [Place] : oriented to place [Time] : oriented to time [Cranial Nerves Optic (II)] : visual acuity intact bilaterally,  visual fields full to confrontation, pupils equal round and reactive to light [Cranial Nerves Oculomotor (III)] : extraocular motion intact [Cranial Nerves Trigeminal (V)] : facial sensation intact symmetrically [Cranial Nerves Facial (VII)] : face symmetrical [Cranial Nerves Vestibulocochlear (VIII)] : hearing was intact bilaterally [Cranial Nerves Glossopharyngeal (IX)] : tongue and palate midline [Cranial Nerves Accessory (XI - Cranial And Spinal)] : head turning and shoulder shrug symmetric [Cranial Nerves Hypoglossal (XII)] : there was no tongue deviation with protrusion [Motor Tone] : muscle tone was normal in all four extremities [Motor Strength] : muscle strength was normal in all four extremities [No Muscle Atrophy] : normal bulk in all four extremities [Motor Handedness Right-Handed] : the patient is right hand dominant [Sensation Tactile Decrease] : light touch was intact [Abnormal Walk] : normal gait [Balance] : balance was intact [Past-pointing] : there was no past-pointing [Tremor] : a tremor present [Dysdiadochokinesia Bilaterally] : not present [Coordination - Dysmetria Impaired Finger-to-Nose Bilateral] : not present [1+] : Patella left 1+ [0] : Ankle jerk left 0 [FreeTextEntry6] : Dyskinesias noted with movements of the torso mild. [Normal] : Normal [L-Spine ___ (level)] : not the lumbar spine [Left Paraspinal ___ (level)] : ~Ulevel [unfilled] left paraspinal [Right Paraspinal ___ (level)] : ~Ulevel [unfilled] right paraspinal [Paraspinal] : paraspinal [4] : L4 [5] : L5 [Restricted] : was restricted [Pain] : was painless

## 2024-07-26 ENCOUNTER — RX RENEWAL (OUTPATIENT)
Age: 74
End: 2024-07-26

## 2024-07-26 RX ORDER — RASAGILINE 0.5 MG/1
0.5 TABLET ORAL
Qty: 90 | Refills: 0 | Status: ACTIVE | COMMUNITY
Start: 2024-07-25 | End: 1900-01-01

## 2024-09-13 ENCOUNTER — APPOINTMENT (OUTPATIENT)
Dept: INTERNAL MEDICINE | Facility: CLINIC | Age: 74
End: 2024-09-13

## 2024-09-13 VITALS
SYSTOLIC BLOOD PRESSURE: 103 MMHG | OXYGEN SATURATION: 93 % | HEART RATE: 86 BPM | DIASTOLIC BLOOD PRESSURE: 64 MMHG | BODY MASS INDEX: 22.26 KG/M2 | WEIGHT: 121 LBS | TEMPERATURE: 98 F | HEIGHT: 62 IN

## 2024-09-13 DIAGNOSIS — R63.0 ANOREXIA: ICD-10-CM

## 2024-09-13 DIAGNOSIS — J45.30 MILD PERSISTENT ASTHMA, UNCOMPLICATED: ICD-10-CM

## 2024-09-13 DIAGNOSIS — R41.3 OTHER AMNESIA: ICD-10-CM

## 2024-09-13 DIAGNOSIS — Z97.8 PRESENCE OF OTHER SPECIFIED DEVICES: ICD-10-CM

## 2024-09-13 DIAGNOSIS — R73.03 PREDIABETES.: ICD-10-CM

## 2024-09-13 DIAGNOSIS — E78.2 MIXED HYPERLIPIDEMIA: ICD-10-CM

## 2024-09-13 DIAGNOSIS — N36.42 INTRINSIC SPHINCTER DEFICIENCY (ISD): ICD-10-CM

## 2024-09-13 DIAGNOSIS — G47.00 INSOMNIA, UNSPECIFIED: ICD-10-CM

## 2024-09-13 DIAGNOSIS — Z01.818 ENCOUNTER FOR OTHER PREPROCEDURAL EXAMINATION: ICD-10-CM

## 2024-09-13 DIAGNOSIS — R06.09 OTHER FORMS OF DYSPNEA: ICD-10-CM

## 2024-09-13 DIAGNOSIS — F32.A ANXIETY DISORDER, UNSPECIFIED: ICD-10-CM

## 2024-09-13 DIAGNOSIS — F41.9 ANXIETY DISORDER, UNSPECIFIED: ICD-10-CM

## 2024-09-13 DIAGNOSIS — G20.A1 PARKINSON'S DISEASE WITHOUT DYSKINESIA, WITHOUT MENTION OF FLUCTUATIONS: ICD-10-CM

## 2024-09-13 DIAGNOSIS — R63.4 ABNORMAL WEIGHT LOSS: ICD-10-CM

## 2024-09-13 PROCEDURE — 99214 OFFICE O/P EST MOD 30 MIN: CPT

## 2024-09-13 PROCEDURE — G0444 DEPRESSION SCREEN ANNUAL: CPT | Mod: 59

## 2024-09-13 PROCEDURE — 36415 COLL VENOUS BLD VENIPUNCTURE: CPT

## 2024-09-14 LAB
25(OH)D3 SERPL-MCNC: 66.8 NG/ML
ALBUMIN SERPL ELPH-MCNC: 4.1 G/DL
ALP BLD-CCNC: 125 U/L
ALT SERPL-CCNC: 10 U/L
ANION GAP SERPL CALC-SCNC: 11 MMOL/L
AST SERPL-CCNC: 19 U/L
BASOPHILS # BLD AUTO: 0.07 K/UL
BASOPHILS NFR BLD AUTO: 1 %
BILIRUB SERPL-MCNC: 0.2 MG/DL
BUN SERPL-MCNC: 18 MG/DL
CALCIUM SERPL-MCNC: 9.3 MG/DL
CHLORIDE SERPL-SCNC: 105 MMOL/L
CHOLEST SERPL-MCNC: 198 MG/DL
CO2 SERPL-SCNC: 25 MMOL/L
CREAT SERPL-MCNC: 0.53 MG/DL
EGFR: 96 ML/MIN/1.73M2
EOSINOPHIL # BLD AUTO: 0.24 K/UL
EOSINOPHIL NFR BLD AUTO: 3.4 %
ESTIMATED AVERAGE GLUCOSE: 123 MG/DL
GLUCOSE SERPL-MCNC: 126 MG/DL
HBA1C MFR BLD HPLC: 5.9 %
HCT VFR BLD CALC: 38.9 %
HDLC SERPL-MCNC: 52 MG/DL
HGB BLD-MCNC: 12.4 G/DL
IMM GRANULOCYTES NFR BLD AUTO: 0.4 %
LDLC SERPL CALC-MCNC: 131 MG/DL
LYMPHOCYTES # BLD AUTO: 1.39 K/UL
LYMPHOCYTES NFR BLD AUTO: 19.7 %
MAN DIFF?: NORMAL
MCHC RBC-ENTMCNC: 27.9 PG
MCHC RBC-ENTMCNC: 31.9 GM/DL
MCV RBC AUTO: 87.6 FL
MONOCYTES # BLD AUTO: 0.49 K/UL
MONOCYTES NFR BLD AUTO: 6.9 %
NEUTROPHILS # BLD AUTO: 4.85 K/UL
NEUTROPHILS NFR BLD AUTO: 68.6 %
NONHDLC SERPL-MCNC: 146 MG/DL
PLATELET # BLD AUTO: 310 K/UL
POTASSIUM SERPL-SCNC: 4.1 MMOL/L
PROT SERPL-MCNC: 7.4 G/DL
RBC # BLD: 4.44 M/UL
RBC # FLD: 15.3 %
SODIUM SERPL-SCNC: 141 MMOL/L
TRIGL SERPL-MCNC: 87 MG/DL
VIT B12 SERPL-MCNC: 386 PG/ML
WBC # FLD AUTO: 7.07 K/UL

## 2024-09-17 LAB
APPEARANCE: CLEAR
BACTERIA: NEGATIVE /HPF
BILIRUBIN URINE: NEGATIVE
BLOOD URINE: NEGATIVE
CALCIUM OXALATE CRYSTALS: PRESENT
CAST: 0 /LPF
COLOR: NORMAL
EPITHELIAL CELLS: 3 /HPF
GLUCOSE QUALITATIVE U: NEGATIVE MG/DL
HYALINE CASTS: PRESENT
KETONES URINE: ABNORMAL MG/DL
LEUKOCYTE ESTERASE URINE: ABNORMAL
MICROSCOPIC-UA: NORMAL
NITRITE URINE: NEGATIVE
PH URINE: 5.5
PROTEIN URINE: NEGATIVE MG/DL
RED BLOOD CELLS URINE: 0 /HPF
REVIEW: NORMAL
SPECIFIC GRAVITY URINE: 1.03
UROBILINOGEN URINE: 0.2 MG/DL
WHITE BLOOD CELLS URINE: 22 /HPF

## 2024-09-20 LAB — BACTERIA UR CULT: ABNORMAL

## 2024-10-01 ENCOUNTER — TRANSCRIPTION ENCOUNTER (OUTPATIENT)
Age: 74
End: 2024-10-01

## 2024-10-01 ENCOUNTER — APPOINTMENT (OUTPATIENT)
Dept: NEUROLOGY | Facility: CLINIC | Age: 74
End: 2024-10-01

## 2024-10-01 DIAGNOSIS — N39.0 URINARY TRACT INFECTION, SITE NOT SPECIFIED: ICD-10-CM

## 2024-10-01 RX ORDER — SULFAMETHOXAZOLE AND TRIMETHOPRIM 800; 160 MG/1; MG/1
800-160 TABLET ORAL
Qty: 10 | Refills: 0 | Status: ACTIVE | COMMUNITY
Start: 2024-10-01 | End: 1900-01-01

## 2024-10-15 NOTE — PATIENT PROFILE ADULT - NSTOBACCONEVERSMOKERY/N_GEN_A
Anesthesia Post-op Note    Patient: iCra Wayne  Procedure(s) Performed: EGD (ESOPHAGOGASTRODUODENOSCOPY)  Anesthesia type: MAC    Vitals Value Taken Time   Temp 36.3 °C (97.4 °F) 10/15/24 1505   Pulse 60 10/15/24 1520   Resp 14 10/15/24 1520   SpO2 97 % 10/15/24 1520   /70 10/15/24 1520         Patient Location: Phase II  Post-op Vital Signs:stable  Level of Consciousness: awake and alert  Respiratory Status: spontaneous ventilation  Cardiovascular stable  Hydration: euvolemic  Pain Management: adequately controlled  Handoff: Handoff to receiving clinician was performed and questions were answered  Vomiting: none  Nausea: None  Airway Patency:patent  Post-op Assessment: no complications and patient tolerated procedure well      There were no known notable events for this encounter.                      
No

## 2025-04-27 NOTE — ED ADULT NURSE NOTE - NS_SISCREENINGSR_GEN_ALL_ED
Pt reports red, warm rash extending from injection site. Received pna vaccine earlier this week. + tender to touch  
Negative

## (undated) DEVICE — FOLEY TRAY 16FR LF URINE METER SURESTEP

## (undated) DEVICE — MEDICATION LABELS W MARKER

## (undated) DEVICE — PREP CHLOROHEXIDINE 4% 118CC KIT

## (undated) DEVICE — SOL IRR POUR H2O 250ML

## (undated) DEVICE — NDL COUNTER FOAM AND MAGNET 40-70

## (undated) DEVICE — SYR LUER LOK 10CC

## (undated) DEVICE — FOLEY CATH 2-WAY 16FR 5CC LATEX LUBRICATH

## (undated) DEVICE — SPECIMEN CONTAINER 100ML

## (undated) DEVICE — NDL HYPO REGULAR BEVEL 22G X 1.5" (TURQUOISE)

## (undated) DEVICE — GOWN TRIMAX LG

## (undated) DEVICE — DRSG KLING 4"

## (undated) DEVICE — GLV 6.5 PROTEXIS (WHITE)

## (undated) DEVICE — DRSG STERISTRIPS 0.5 X 4"

## (undated) DEVICE — SOL IRR POUR NS 0.9% 500ML

## (undated) DEVICE — PACK GYN LAPAROSCOPY

## (undated) DEVICE — SUCTION YANKAUER NO CONTROL VENT

## (undated) DEVICE — GLV 8 PROTEXIS (WHITE)

## (undated) DEVICE — FOLEY CATH 2-WAY 18FR 5CC LATEX HYDROGEL

## (undated) DEVICE — DRAPE MAYO STAND 30"

## (undated) DEVICE — FOLEY TRAY 16FR 5CC LTX UMETER CLOSED

## (undated) DEVICE — DRAPE 3/4 SHEET W REINFORCEMENT 56X77"

## (undated) DEVICE — ELCTR BOVIE PENCIL SMOKE EVACUATION

## (undated) DEVICE — SUT POLYSORB 2-0 30" V-20 UNDYED

## (undated) DEVICE — BLADE SCALPEL SAFETYLOCK #10

## (undated) DEVICE — PREP BETADINE KIT

## (undated) DEVICE — TUBING SUCTION 20FT

## (undated) DEVICE — BAG URINE W METER 2L

## (undated) DEVICE — GLV 7.5 PROTEXIS (WHITE)

## (undated) DEVICE — BLADE SCALPEL SAFETYLOCK #15

## (undated) DEVICE — SUT POLYSORB 3-0 30" V-20 UNDYED

## (undated) DEVICE — VISITEC 4X4

## (undated) DEVICE — DRAPE TOWEL BLUE 17" X 24"

## (undated) DEVICE — SOL IRR BAG H2O 3000ML

## (undated) DEVICE — WARMING BLANKET UPPER ADULT

## (undated) DEVICE — PREP BETADINE SPONGE STICKS

## (undated) DEVICE — GLV 8.5 PROTEXIS (WHITE)

## (undated) DEVICE — VENODYNE/SCD SLEEVE CALF LARGE

## (undated) DEVICE — GLV 7 PROTEXIS (WHITE)

## (undated) DEVICE — TUBING TUR 2 PRONG

## (undated) DEVICE — POSITIONER FOAM EGG CRATE ULNAR 2PCS (PINK)

## (undated) DEVICE — SUT PDS II 2-0 27" CT-2